# Patient Record
Sex: FEMALE | Race: WHITE | Employment: OTHER | URBAN - METROPOLITAN AREA
[De-identification: names, ages, dates, MRNs, and addresses within clinical notes are randomized per-mention and may not be internally consistent; named-entity substitution may affect disease eponyms.]

---

## 2018-03-01 ENCOUNTER — HOSPITAL ENCOUNTER (OUTPATIENT)
Dept: CARDIAC REHAB | Age: 78
Discharge: HOME | End: 2018-03-01
Attending: INTERNAL MEDICINE | Admitting: INTERNAL MEDICINE

## 2018-03-01 DIAGNOSIS — J44.9 COPD, SEVERE (HCC): ICD-10-CM

## 2018-03-01 PROCEDURE — 94618 PULMONARY STRESS TESTING: CPT | Performed by: INTERNAL MEDICINE

## 2018-03-02 PROBLEM — J44.9 COPD, SEVERE (HCC): Status: ACTIVE | Noted: 2018-03-02

## 2018-03-06 ENCOUNTER — HOSPITAL ENCOUNTER (OUTPATIENT)
Dept: CARDIAC REHAB | Age: 78
Discharge: HOME | End: 2018-03-31
Attending: INTERNAL MEDICINE | Admitting: INTERNAL MEDICINE

## 2018-03-06 RX ORDER — AMLODIPINE BESYLATE 5 MG/1
5 TABLET ORAL DAILY
COMMUNITY

## 2018-03-06 RX ORDER — METOPROLOL SUCCINATE 25 MG/1
25 TABLET, EXTENDED RELEASE ORAL DAILY
COMMUNITY

## 2018-03-06 RX ORDER — PANTOPRAZOLE SODIUM 40 MG/10ML
40 INJECTION, POWDER, LYOPHILIZED, FOR SOLUTION INTRAVENOUS DAILY
Status: ON HOLD | COMMUNITY
End: 2019-12-30

## 2018-03-06 RX ORDER — ATORVASTATIN CALCIUM 40 MG/1
40 TABLET, FILM COATED ORAL DAILY
COMMUNITY

## 2018-03-08 ENCOUNTER — HOSPITAL ENCOUNTER (OUTPATIENT)
Dept: CARDIAC REHAB | Age: 78
Discharge: HOME | End: 2018-03-09

## 2018-03-08 VITALS — WEIGHT: 91.5 LBS

## 2018-03-15 ENCOUNTER — HOSPITAL ENCOUNTER (OUTPATIENT)
Dept: CARDIAC REHAB | Age: 78
Discharge: HOME | End: 2018-03-16

## 2018-03-15 VITALS — WEIGHT: 92.1 LBS

## 2018-03-20 ENCOUNTER — HOSPITAL ENCOUNTER (OUTPATIENT)
Dept: CARDIAC REHAB | Age: 78
Discharge: HOME | End: 2018-03-21

## 2018-03-20 VITALS — WEIGHT: 92.9 LBS

## 2018-03-20 NOTE — FLOWSHEET NOTE
Attended part 1 of nutrition education class. Session time= 12:55-2:30pm. Increased workload on NS. No complaint.

## 2018-03-22 ENCOUNTER — HOSPITAL ENCOUNTER (OUTPATIENT)
Dept: CARDIAC REHAB | Age: 78
Discharge: HOME | End: 2018-03-23

## 2018-03-22 VITALS — WEIGHT: 92.8 LBS

## 2018-03-27 ENCOUNTER — HOSPITAL ENCOUNTER (OUTPATIENT)
Dept: CARDIAC REHAB | Age: 78
Discharge: HOME | End: 2018-03-28

## 2018-03-27 VITALS — WEIGHT: 94 LBS

## 2018-03-29 ENCOUNTER — HOSPITAL ENCOUNTER (OUTPATIENT)
Dept: CARDIAC REHAB | Age: 78
Discharge: HOME | End: 2018-03-30

## 2018-03-29 VITALS — WEIGHT: 93.4 LBS

## 2018-04-01 ENCOUNTER — HOSPITAL ENCOUNTER (OUTPATIENT)
Dept: OTHER | Age: 78
Discharge: HOME | End: 2018-04-30
Attending: INTERNAL MEDICINE | Admitting: INTERNAL MEDICINE

## 2018-04-03 ENCOUNTER — HOSPITAL ENCOUNTER (OUTPATIENT)
Dept: CARDIAC REHAB | Age: 78
Discharge: HOME | End: 2018-04-04

## 2018-04-03 VITALS — WEIGHT: 93.6 LBS

## 2018-04-03 NOTE — PROGRESS NOTES
Post Rehab   6 MWT  ft = % pred   METs  SpO2: %  MPH  HR:  bpm      RPD:  , RPE:                                        GOALS  List at least 2 patient specific goals    [x]Improve activity for tolerance for routine tasks and walking    [x]Learn proper breathing techniques including PLB    [x]Learn proper pacing with activities    []Learn proper use of oxygen, systems and safety    [x]Learn proper exercise guidelines    []Learn proper nutrition for my diagnosis      [x] \"Would like to be able to get out and work in the yard/gardening this summer\"                    Learning Barrier(s)  [] Speech           [] Literacy              [] Hearing          [] Cognitive            [] Vision             [x]  Ready to Learn          Balance Issues  [] Y  [x] N            Orthopedic Issues  []  Y[x]  N        Rate your Physical   Fitness (PF)?    5/10    Handgrip:  Right: 17  Left:18    EDUCATION  [x]  Staff Introduction  [x]  Proper setup/O2 use  [x]  Equipment Babcock'n  [x]  RPD/RPE Explain  [x]  SpO2/HR Explain  [x]  Breathing Retraining  [x]  Explain Intensity  [x] Initial Levels set GOALS          -Activity tolerance is improving.         -Continues to learn        -Continues to learn              :   -Continues to learn                -\"not yet\"                Rate your physical   fitness (PF)?:   6/10        -30 day PF goal:  7/10    EDUCATION  [x] Understands proper set/up O2 use  [x]  Understands SpO2 and RPD? [x] Aerobic progression explained? [x]  Intensity progression explained?           GOALS                                                                                           Rate your physical   fitness (PF)?:   /10        -30 day PF goal:  /10    EDUCATION   []  Home Activity education offered  [] Stretching/ Flexibility education offered  [] Attended Benefits of Exercise Class  []  Attended Resistance Training Class GOALS                                                                                           Rate your physical   fitness (PF)?:   /10    -30 day PF goal:  /10    EDUCATION  [] Attended all individually relevant exercise education sessions? []  Knows current exercise goals and recmndtns?:                                                  Goals Achieved? Rate your physical fitness now?:     /10  Handgrip:  Right:  Left:    Discharge  EDUCATION  []  Attended all individually relevant exercise education sessions?    [] Knows current exercise goals and recmndtns?:                                                                                        PHYSICIAN DIRECTED   EXERCISE RX     RPE : 11 - 14  Titrate O2 to keep SpO2 >89%    Frequency (F): 2-3x/wk in Rehab,         Initial Intensity : 2.5 METs (from 6MWT)   1.5--2 (60-80% of walk speed for TM)    Type (T): Aerobic (TM,NS, SF, AE, AB, RB, EL, Arc), RT 1-3#, 8-16 reps    CAN USE ALL EQUIPMENT EXCEPT       Time (Ti): Aerobic:   15-40 min               Progression: 1-2 min total time for TM, AB, NS, SF or Arm ergometer per session or when a steady state has occurred in RPE if  SpO2 and HR levels are acceptable           PHYSICIAN DIRECTED   EXERCISE RX       Titrate O2 to keep SpO2 >89%    Frequency (F): 2-3x/wk in Rehab, 1-3x/wk home walking       Intensity (I):  Initial + 5-10% increase each week or when a steady state has occurred in RPE if  SpO2 and HR levels are acceptable  Type (T)  Aerobic (TM,NS, SFR,SFS, AE, AB, RB), RT   8-16 reps    CAN USE ALL EQUIPMENT EXCEPT        Time (Ti): Aerobic:   30-40 min        Progression:   1-2 min total time for TM, AB, NS, SF or Arm ergometer per session or when a steady state has occurred in RPE if  SpO2 and HR levels are acceptable        Is pt. progressing in rehab?:    Yes           PHYSICIAN DIRECTED   EXERCISE RX       Titrate O2 week is recommended)            Weight Gain        30 day   Target Goal:    [] increase proteins  [] add nutrition  Supplement  [] 6 small meals      Did pt meet Initial 30 day Target Goal(s)?:     New 30 DAY TARGET GOAL:    [] Decrease saturated fats  [] Decrease gas producing  cruciferous veggies   -  Reasonable, achievable 30 day weight goal:                                  Discussed adding more protein and fiber to diet Did pt meet previous 30 day Target   Goal(s)?:     New 30 DAY TARGET GOAL:    [] Switch to whole grains whenever possible  [] Decrease/ Eliminate carbonated beverages    Reasonable, achievable 30 day weight goal:    Did pt meet previous 30 day Target   Goal(s)?:      New 30 DAY TARGET GOAL:    [] Smaller meals more frequently  [] Decrease portion sizes by 25%      Reasonable, achievable 30 day weight goal:                  Did pt meet previous 30 day Target   Goal(s)?:                         Marixa's INDIVIDUAL TREATMENT PLAN  PSYCHOSOCIAL DOMAIN:    Psychosocial   Initial Assessment  Day 1 Psychosocial   Intervention  Day 2-30 Psychosocial  Re-Assessment  Day 31-60 Psychosocial   Re-Assessment  Day 61-90+ Psychosocial Discharge  Final Day   Psychosocial Screening Tools    (X) PHQ-9 score: 5      (X) SF-36: 84       (X) UCSD SOB score: 47         PHQ-9 Score: If score >or =15, Action:     SF-36 Mental Score:     UCSD Score: Any action on Screening Tools?:                  Psychosocial Screening   Tools    Repeat PHQ-9 Score if mariluz:    Repeat SF-36      Repeat UCSD SOB Score:       Assessment  []  S/S of depression identified?     []  PCP notified of PHQ-9 results      []  On Anti-anxiety / anti-depression meds?: 0           Intervention  If S/S of depression present, action taken:     Is the patient receiving support for the TX program at home?:  []  Y    [x] N- pt lives alone and has limited contacts    Is the patient tolerating the intensity and duration of the TX program?:     [x] outings with daughters and friends but has been unable to keep up due to her increased shortness of breath and fatigue. This has been worse since her hospitalization in December 2017. She admits to occasionally having mild bouts of anxiety and depression, rating both a 3/10, but states she quickly \"gets over it\". She rates her mood 8/10.     30 Day Progress Report 4/3/2018  Attendance: Bryan Salmon has attended 8 exercise sessions and 2 education classes during Pulmonary Rehab. Exercise: She has exercised 20-40 minutes at low-moderate levels. Oxygen:Exercises on room air. Medications: No changes  Nutrition: Wt stable at 93.6lbs. She has attended part 1&2 of general nutrition class. Psychosocial: Reports no changes in her levels of anxiety and depression and her mood remains mostly positive. Leon Jennings RN                 Referring Physician: Dr. Angelica Sotelo                                              Fax #:[phone]    Reviewed and electronically signed by Dr Viky Davila.  Bryson Saunders, Medical Director

## 2018-04-05 ENCOUNTER — HOSPITAL ENCOUNTER (OUTPATIENT)
Dept: CARDIAC REHAB | Age: 78
Discharge: HOME | End: 2018-04-06

## 2018-04-05 VITALS — WEIGHT: 94.5 LBS

## 2018-04-10 ENCOUNTER — HOSPITAL ENCOUNTER (OUTPATIENT)
Dept: CARDIAC REHAB | Age: 78
Discharge: HOME | End: 2018-04-11

## 2018-04-10 VITALS — WEIGHT: 94.1 LBS

## 2018-04-17 ENCOUNTER — HOSPITAL ENCOUNTER (OUTPATIENT)
Dept: CARDIAC REHAB | Age: 78
Discharge: HOME | End: 2018-04-18

## 2018-04-17 VITALS — WEIGHT: 94.1 LBS

## 2018-04-19 ENCOUNTER — HOSPITAL ENCOUNTER (OUTPATIENT)
Dept: CARDIAC REHAB | Age: 78
Discharge: HOME | End: 2018-04-20

## 2018-04-19 VITALS — WEIGHT: 94.4 LBS

## 2018-04-24 ENCOUNTER — HOSPITAL ENCOUNTER (OUTPATIENT)
Dept: CARDIAC REHAB | Age: 78
Discharge: HOME | End: 2018-04-25

## 2018-04-24 VITALS — WEIGHT: 94.1 LBS

## 2018-04-26 ENCOUNTER — HOSPITAL ENCOUNTER (OUTPATIENT)
Dept: CARDIAC REHAB | Age: 78
Discharge: HOME | End: 2018-04-27

## 2018-04-26 VITALS — WEIGHT: 94 LBS

## 2018-05-01 ENCOUNTER — HOSPITAL ENCOUNTER (OUTPATIENT)
Dept: OTHER | Age: 78
Discharge: HOME | End: 2018-05-31
Attending: INTERNAL MEDICINE | Admitting: INTERNAL MEDICINE

## 2018-05-01 ENCOUNTER — HOSPITAL ENCOUNTER (OUTPATIENT)
Dept: CARDIAC REHAB | Age: 78
Discharge: HOME | End: 2018-05-02

## 2018-05-01 VITALS — WEIGHT: 93 LBS

## 2018-05-03 ENCOUNTER — HOSPITAL ENCOUNTER (OUTPATIENT)
Dept: CARDIAC REHAB | Age: 78
Discharge: HOME | End: 2018-05-04

## 2018-05-03 VITALS — WEIGHT: 93.1 LBS

## 2018-05-08 ENCOUNTER — HOSPITAL ENCOUNTER (OUTPATIENT)
Dept: CARDIAC REHAB | Age: 78
Discharge: HOME | End: 2018-05-09

## 2018-05-08 VITALS — WEIGHT: 95 LBS

## 2018-05-10 ENCOUNTER — HOSPITAL ENCOUNTER (OUTPATIENT)
Dept: CARDIAC REHAB | Age: 78
Discharge: HOME | End: 2018-05-11

## 2018-05-10 VITALS — WEIGHT: 94.1 LBS

## 2018-05-15 ENCOUNTER — HOSPITAL ENCOUNTER (OUTPATIENT)
Dept: CARDIAC REHAB | Age: 78
Discharge: HOME | End: 2018-05-16

## 2018-05-15 VITALS — WEIGHT: 94.2 LBS

## 2018-05-22 ENCOUNTER — HOSPITAL ENCOUNTER (OUTPATIENT)
Dept: CARDIAC REHAB | Age: 78
Discharge: HOME | End: 2018-05-23

## 2018-05-22 VITALS — WEIGHT: 94 LBS

## 2018-05-24 ENCOUNTER — HOSPITAL ENCOUNTER (OUTPATIENT)
Dept: CARDIAC REHAB | Age: 78
Discharge: HOME | End: 2018-05-25

## 2018-05-24 VITALS — WEIGHT: 93.6 LBS

## 2018-05-29 ENCOUNTER — HOSPITAL ENCOUNTER (OUTPATIENT)
Dept: CARDIAC REHAB | Age: 78
Discharge: HOME | End: 2018-05-30

## 2018-05-29 VITALS — WEIGHT: 93.3 LBS

## 2018-05-29 NOTE — PROGRESS NOTES
Attended education class on preventing and recognizing infection and when to call the doctor. Also explained bronchial hygiene techniques.  Session time= 12:50-2:25pm

## 2018-05-31 ENCOUNTER — HOSPITAL ENCOUNTER (OUTPATIENT)
Dept: CARDIAC REHAB | Age: 78
Discharge: HOME | End: 2018-06-30

## 2018-05-31 VITALS — WEIGHT: 93.4 LBS

## 2018-06-01 ENCOUNTER — HOSPITAL ENCOUNTER (OUTPATIENT)
Dept: OTHER | Age: 78
Discharge: HOME | End: 2018-06-01
Attending: INTERNAL MEDICINE | Admitting: INTERNAL MEDICINE

## 2018-06-05 ENCOUNTER — HOSPITAL ENCOUNTER (OUTPATIENT)
Dept: CARDIAC REHAB | Age: 78
Discharge: HOME | End: 2018-06-06

## 2018-06-05 VITALS — WEIGHT: 93 LBS

## 2018-06-07 ENCOUNTER — HOSPITAL ENCOUNTER (OUTPATIENT)
Dept: CARDIAC REHAB | Age: 78
Discharge: HOME | End: 2018-06-08

## 2018-06-07 VITALS — WEIGHT: 93 LBS

## 2018-06-07 PROCEDURE — 94618 PULMONARY STRESS TESTING: CPT | Performed by: INTERNAL MEDICINE

## 2018-06-07 NOTE — PROCEDURES
[facility] Cardiopulmonary Rehabilitation   Six Minute Walk Test    Melanie PALACIOSB: 1940  Account Number: [de-identified]  AGE: 78 y.o.     OP test []   Pulmonary Rehab PRE []  POST   [x]    Diagnosis:  Severe COPD    Referring Physician: Dr. Deisi Carpio    Gender []  male [x] female AGE:77 Race:    Height:4 ft 10 in 147 cm    Weight:93 lbs  42 kg  Blood Pressure 126/70    Medications affecting heart rate:  None      Supplemental O2 during the test []  no [x] yes 2 lpm     [x] continuous []  intermittent    Device : [x] NC []  HFNC    []  oximizer  [] mask      Laps completed: 8.5 (1 lap = 100 ft)        Baseline (resting) Walking End of Test (recovery)      Heart Rate 67   93  62    BP  126/70   112/60  130/72    Dyspnea 0   3  0 (Kamala scale)    Fatigue 0   2  0 (Kamala scale)    SpO2  100%   96%  100%    Stopped or paused before 6 mins? [x]  no [] yes How long? Symptoms at end of exercise:[] angina  [] dizziness  []  hip, leg, calf, back pain       [x]  no complaints []  other    Number of laps 8 (x 30.48 meters) 244 meters + final partial lap: 15 meters    Total distance walked in 6 mins: 259 meters    Predicted distance: 447.5 meters  Percent predicted:58%              [] normal       [x] reduced     Pre Rehab Walk: Walked 305 meters; Lowest SaO2= 80% RA (92% on 2lpm); %predicted =68% (reduced); Dyspnea=9; Fatigue=5  Post Rehab Walk: Walked 259 meters; Lowest SaO2= 96% 2lpm; % predicted= 58% (reduced); Dyspnea=3; Fatigue=2       Summary: This is a post-rehab test, performed on 2 liters of oxygen. The patient ambulated 259 meters which is abnormal- 58-% predicted. Her  heart rate response was normal, the blood pressure response was blunted and decrease with exertion, oxygen saturations were normal.   The degree of symptoms based on the Kamala Dyspnea/Fatigue scale were increased with testing. Oxygen level remained at 96% on supplemental oxygen.   When compared to the pre-rehab walk test, she ambulated 47 meters less, which is a significant decline in distance.             Leeanna Castro, DO  Pulmonary Rehab Director

## 2018-06-07 NOTE — PROGRESS NOTES
Preparing for completion of the program.  6 min walk completed. Final paperwork given to the patient to complete and return. Pt states she will continue exercise at home. Maintenance program was reviewed with patient.

## 2018-06-12 ENCOUNTER — HOSPITAL ENCOUNTER (OUTPATIENT)
Dept: CARDIAC REHAB | Age: 78
Discharge: HOME | End: 2018-06-13

## 2018-06-14 ENCOUNTER — HOSPITAL ENCOUNTER (OUTPATIENT)
Dept: CARDIAC REHAB | Age: 78
Discharge: HOME | End: 2018-06-15

## 2018-06-14 VITALS — WEIGHT: 92 LBS

## 2018-07-01 ENCOUNTER — HOSPITAL ENCOUNTER (OUTPATIENT)
Dept: OTHER | Age: 78
Discharge: HOME | End: 2018-07-31
Attending: INTERNAL MEDICINE | Admitting: INTERNAL MEDICINE

## 2018-07-03 NOTE — PROGRESS NOTES
RPD: 9, RPE: 5                                           Post Rehab   6 MWT  ft = 58% pred  2.2 METs  SpO2:96 %  1.6 MPH  HR: 93 bpm      RPD:  3, RPE:                               0         GOALS  List at least 2 patient specific goals    [x]Improve activity for tolerance for routine tasks and walking    [x]Learn proper breathing techniques including PLB    [x]Learn proper pacing with activities    []Learn proper use of oxygen, systems and safety    [x]Learn proper exercise guidelines    []Learn proper nutrition for my diagnosis      [x] \"Would like to be able to get out and work in the yard/gardening this summer\"                    Learning Barrier(s)  [] Speech           [] Literacy              [] Hearing          [] Cognitive            [] Vision             [x]  Ready to Learn          Balance Issues  [] Y  [x] N            Orthopedic Issues  []  Y[x]  N        Rate your Physical   Fitness (PF)?    5/10    Handgrip:  Right: 17  Left:18    EDUCATION  [x]  Staff Introduction  [x]  Proper setup/O2 use  [x]  Equipment Emigrant'n  [x]  RPD/RPE Explain  [x]  SpO2/HR Explain  [x]  Breathing Retraining  [x]  Explain Intensity  [x] Initial Levels set GOALS          -Activity tolerance is improving.         -Continues to learn        -Continues to learn              :   -Continues to learn                -\"not yet\"                Rate your physical   fitness (PF)?:   6/10        -30 day PF goal:  7/10    EDUCATION  [x] Understands proper set/up O2 use  [x]  Understands SpO2 and RPD? [x] Aerobic progression explained? [x]  Intensity progression explained?           GOALS          - Yes- \"a little\"            - Yes          - Yes                  - Yes                  - \"not yet\"                                                                                         Rate your physical   fitness (PF)?:   6/10    \"Can climb stairs without sob now\"      -30 day PF goal:  7/10    EDUCATION   []  Home Activity education per session or when a steady state has occurred in RPE if  SpO2 and HR levels are acceptable        Is pt. progressing in rehab?:    Yes           PHYSICIAN DIRECTED   EXERCISE RX       Titrate O2 to keep SpO2 >89%    Frequency (F): 2-3x/wk in Rehab, 1-3x/wk home walking       Intensity (I):  Initial + 5-10% increase each week when a steady state has occurred in RPE if  SpO2 and HR levels are acceptable  Type (T)  Aerobic (TM,NS, SFR,SFS, AE, AB, RB), RT   8-16 reps    CAN USE ALL EQUIPMENT EXCEPT        Time (Ti): Aerobic:   30-50 min     Progression:   1-2 min total time for TM, AB, NS, SF or Arm ergometer per session or when a steady state has occurred in RPE if  SpO2 and HR levels are acceptable              Is pt. progressing in rehab?:  Yes               PHYSICIAN DIRECTED   EXERCISE RX       Titrate O2 to keep SpO2 >89%    Frequency (F): 2-3x/wk in Rehab, 1-3x/wk home walking       Intensity (I):  Initial + 5-10% increase each week when a steady state has occurred in RPE if  SpO2 and HR levels are acceptable  Type (T):   Aerobic (TM,NS, SFR,SFS, AE, AB, RB), RT   8-16 reps    CAN USE ALL EQUIPMENT EXCEPT          Time (Ti): Aerobic:   30-58 min         Progression:   1-2 min total time for TM, AB, NS, SF or Arm ergometer per session or when a steady state has occurred in RPE if  SpO2 and HR levels are acceptable            Is pt. progressing in rehab?:               Final Intensity (I): See below and final 6MWT above            ACHIEVED TOTAL AEROBIC EXERCISE TIME:  min         FINAL LEVELS:  [x] TM: 150min  [x] Nustep: level 7 min:20  [x] Arm ergometer:10 ndiaye min  [] Scifit: level min  [] HW :  # x  reps  [] Dy:    X   reps  [] Cybex RT:    # x   Reps  [] Eliptical:level  X  Min  [] Arc: level   X    mins  [] RB: 2 Level  X 10  mins  [] AB: level   X     Min              Did pt. progress in rehab?:Yes     30 DAY TARGET GOAL  [x] Start slowly but progress each week  [x] Increase duration on the Management     Intervention/  Education                Marixa's INDIVIDUAL TREATMENT PLAN  OXYGEN AND MEDICATIONS    OXYGEN  MEDICATIONS   Initial Assessment  Day 1 OXYGEN  MEDICATIONS  Intervention  Day 2-30 OXYGEN  MEDICATION  Re-Assessment  Day 31-60 OXYGEN  MEDICATION  ReAssessment Day 61-90+ OXYGEN  MEDICATION  Discharge  Final Day                    Medications  [x]  Uses as prescribed  []  Non- compliant with prescribed meds    Respiratory Medications    [x]  Proper use   and technique of MDI, DPI and/or nebs  []  Incorrect use and technique of MDI, DPI and /or nebs    Hypoxemia  Problem:    [x]  No problem  [] Noncompliant with O2 use  []  Oxygen in [place] only  []  No home O2  []  No portable O2  []  Needs O2 prescription and O2 qualifying data sent for setup   ()Poor knowledge of O2 use/safety    Current Oxygen Prescription:    l/min rest  2 l/min exercise   titration   plan/weaning plan:  CPAP/BIPAP:    Goals:     []  Manage Hypoxemia  []  receive adequate portable system  [x]  Compliant with use as prescribed  []  Learn O2 safety guidelines   Medications  [x]  Uses as prescribed  []  Non- compliant with prescribed meds    Respiratory Medications    [x] Proper use and technique of MDI, DPI and/or nebs  []  Incorrect use and technique of MDI, DPI and /or nebs    Hypoxemia  Problem:      Current Oxygen Prescription:    l/min rest   l/min exercise   titration plan/weaning plan:    Goals:   []  Manage Hypoxemia  []  receive adequate portable system  [x]  Compliant with use as prescribed  [x]  Learn O2 safety guidelines           Medications  [x]  Uses as prescribed  [] Non- compliant with prescribed meds    Respiratory Medications    []  Proper use and technique of MDI, DPI and/or nebs  [] Incorrect use and technique of MDI, DPI and /or nebs    Hypoxemia  Problem:      Current Oxygen Prescription:    l/min rest   l/min exercise   titration plan/weaning plan:  2lpm at HS  Goals:   []  Manage Hypoxemia  []  receive GOALS        Did pt meet previous 30 day Target Goal(s)?:      RE- ASSESSMENT GOAL    [x] Decrease/  Maintain anxiety and depression level  [x] Increase ability to complete ADL  -          (0 being 'None', 10 being 'Very'),  -How would you rate your Anxiety Level: 1      -How would you rate your level of depression: 0    -How would you rate your mood:  8/10                              ADL'S- 8 GOALS        Did pt meet previous 30 day Target Goal(s)?:      RE- ASSESSMENT GOAL    [x] Decrease/  Maintain anxiety and depression level  [] Increase ability to complete ADL  -          (0 being 'None', 10 being 'Very'),  -How would you rate your Anxiety Level:      -How would you rate your level of depression:    -How would you rate your mood:     Discharge            Did pts SF-36 Mental Score increase?:  unchanged        [x] Pt is aware of the s/s of depression    [] Received Psychosocial Education    Any follow up with physicians  necessary?:      [] Y    [x] N               Marixa's INDIVIDUAL TREATMENT PLAN  EDUCATION DOMAIN:    EDUCATION   Initial Assessment  Day 1 EDUCATION   Intervention  Day 2-30 EDUCATION   Re-Assessment  Day 31-60 EDUCATION   ReAssessment Day 61-90+ EDUCATION Discharge  Final Day                      Education  [x] Equipment/Staff Orientation  [x] Breathing Retraining  [x] Importance of Clean Hands    Chronic Cough?:   [] Y   [x]  N  Spacer?:   [x]   Y   []  N    Sleep Apnea?:  [] Y    [x] N     [x] Education Book given       Education  Individual instruction  [x] PLB  [x] RPD/RPE   [] O2 use  []  review PFT  [x] Inhalers   [x]Home Activity/Warm up/ stretches  Attend Classes:  [x] Nutrition  [] Panic control, relaxation, managing depression  [x] A&P of the Respiratory System   [] Environ. Issues+ Travel   [] Bronchial Hygiene / Preventing Infection  [] Resistance Training  []  Benefits of Exercise  [] Energy Cons        Education  Individual instruction  [] PLB  [] RPD/RPE   [] O2 use  [] review PFT  [] Inhalers   [] Home Activity/Warm up/ stretches  Attend Classes:  [] Nutrition  [x]  Panic conntrol, relaxation, managing depression  []  A&P of the Respiratory System   [x] Environ. Issues+ Travel   [] Bronchial Hygiene / Preventing Infection  [x]  Resistance Training  [] Benefits of Exercise  [x] Energy Cons    Education  Individual instruction  [] PLB  [] RPD/RPE   []  O2 use  []  review PFT  [] Inhalers   [] Home Activity/Warm up/ stretches  Attend Classes:  [] Nutrition  []  Panic conntrol, relaxation, managing depression  [] A&P of the Respiratory System   [] Environ. Issues+ Travel   [x] Bronchial Hygiene / Preventing Infection  []  Resistance Training  [] Benefits of Exercise  [] Energy Cons   Education  [x]  Addressed pt questions on Education Topics                                                         Physician Interaction / Response:  (If none, continue on present care plan)     Physician Interaction / Response:   (If none, continue on present care plan)   Physician Interaction / Response:   (If none, continue on present care plan)   Physician Interaction / Response:   (If none, continue on present care plan)   Physician Interaction / Response:       Discharge the patient. Rehab Potential:  []  Good [] Fair [] Poor    Summary  Siri Avina is a pleasant 78 yr old female with a history of Severe COPD and HTN who was referred to Pulmonary Rehab here at [facility] by her Pulmonologist for shortness of breath with exertion. She had a recent hospitalization in December 2017 for pneumonia and feels that she is not yet back to her baseline. She was discharged home on Oxygen 2 lpm at that time and has remained on it continuously since then. She will be attending exercise session in [place] twice a week. Faisal Sarkar will attend 28 Sessions of [place] Phase 2. Exercise: Faisal Sarkar will participate in 15-45 min of aerobic exercise.  Time and intensity to be determined based on patient's social with staff and other rehab participants. She continues to rate her anxiety and depression a 0/10 and her mood unchanged at 8/10. Other: At this time, Alban Burris is unsure of her exercise plans despite our encouragement to do so. Mahamed Vee RN                Referring Physician: Dr. Ilda Sanford                                              Fax #:[phone]    Reviewed and electronically signed by Dr Claude Grimaldo.  Alexia Weston, Medical Director

## 2019-07-04 ENCOUNTER — HOSPITAL ENCOUNTER (EMERGENCY)
Age: 79
Discharge: HOME | End: 2019-07-04
Attending: EMERGENCY MEDICINE
Payer: MEDICARE

## 2019-07-04 ENCOUNTER — APPOINTMENT (OUTPATIENT)
Dept: CT IMAGING | Age: 79
End: 2019-07-04
Payer: MEDICARE

## 2019-07-04 ENCOUNTER — APPOINTMENT (OUTPATIENT)
Dept: GENERAL RADIOLOGY | Age: 79
End: 2019-07-04
Payer: MEDICARE

## 2019-07-04 VITALS
HEART RATE: 71 BPM | TEMPERATURE: 97.3 F | OXYGEN SATURATION: 100 % | SYSTOLIC BLOOD PRESSURE: 120 MMHG | RESPIRATION RATE: 20 BRPM | DIASTOLIC BLOOD PRESSURE: 65 MMHG

## 2019-07-04 DIAGNOSIS — J44.9 CHRONIC OBSTRUCTIVE PULMONARY DISEASE, UNSPECIFIED COPD TYPE (HCC): ICD-10-CM

## 2019-07-04 DIAGNOSIS — R07.89 CHEST WALL PAIN: Primary | ICD-10-CM

## 2019-07-04 LAB
A/G RATIO: 1.4 (ref 1.1–2.2)
ALBUMIN SERPL-MCNC: 4.4 G/DL (ref 3.4–5)
ALP BLD-CCNC: 84 U/L (ref 40–129)
ALT SERPL-CCNC: 10 U/L (ref 10–40)
ANION GAP SERPL CALCULATED.3IONS-SCNC: 13 MMOL/L (ref 3–16)
AST SERPL-CCNC: 17 U/L (ref 15–37)
BASE EXCESS VENOUS: -1.1 MMOL/L
BASOPHILS ABSOLUTE: 0 K/UL (ref 0–0.2)
BASOPHILS RELATIVE PERCENT: 0.4 %
BILIRUB SERPL-MCNC: 0.6 MG/DL (ref 0–1)
BUN BLDV-MCNC: 20 MG/DL (ref 7–20)
CALCIUM SERPL-MCNC: 9.8 MG/DL (ref 8.3–10.6)
CARBOXYHEMOGLOBIN: 3.3 %
CHLORIDE BLD-SCNC: 101 MMOL/L (ref 99–110)
CO2: 24 MMOL/L (ref 21–32)
CREAT SERPL-MCNC: 1.2 MG/DL (ref 0.6–1.2)
EOSINOPHILS ABSOLUTE: 0.1 K/UL (ref 0–0.6)
EOSINOPHILS RELATIVE PERCENT: 1 %
GFR AFRICAN AMERICAN: 53
GFR NON-AFRICAN AMERICAN: 44
GLOBULIN: 3.2 G/DL
GLUCOSE BLD-MCNC: 136 MG/DL (ref 70–99)
HCO3 VENOUS: 25 MMOL/L (ref 23–29)
HCT VFR BLD CALC: 35.8 % (ref 36–48)
HEMOGLOBIN: 11.9 G/DL (ref 12–16)
LACTIC ACID: 1.1 MMOL/L (ref 0.4–2)
LYMPHOCYTES ABSOLUTE: 1.6 K/UL (ref 1–5.1)
LYMPHOCYTES RELATIVE PERCENT: 16.7 %
MCH RBC QN AUTO: 33.7 PG (ref 26–34)
MCHC RBC AUTO-ENTMCNC: 33.4 G/DL (ref 31–36)
MCV RBC AUTO: 101 FL (ref 80–100)
METHEMOGLOBIN VENOUS: 0.6 %
MONOCYTES ABSOLUTE: 0.8 K/UL (ref 0–1.3)
MONOCYTES RELATIVE PERCENT: 8.4 %
NEUTROPHILS ABSOLUTE: 7 K/UL (ref 1.7–7.7)
NEUTROPHILS RELATIVE PERCENT: 73.5 %
O2 CONTENT, VEN: 13 ML/DL
O2 SAT, VEN: 80 %
O2 THERAPY: ABNORMAL
PCO2, VEN: 49.5 MMHG (ref 40–50)
PDW BLD-RTO: 12.9 % (ref 12.4–15.4)
PH VENOUS: 7.32 (ref 7.35–7.45)
PLATELET # BLD: 260 K/UL (ref 135–450)
PMV BLD AUTO: 8.1 FL (ref 5–10.5)
PO2, VEN: 45 MMHG
POTASSIUM REFLEX MAGNESIUM: 4.3 MMOL/L (ref 3.5–5.1)
PRO-BNP: 625 PG/ML (ref 0–449)
RBC # BLD: 3.54 M/UL (ref 4–5.2)
SODIUM BLD-SCNC: 138 MMOL/L (ref 136–145)
TCO2 CALC VENOUS: 26 MMOL/L
TOTAL PROTEIN: 7.6 G/DL (ref 6.4–8.2)
TROPONIN: <0.01 NG/ML
TROPONIN: <0.01 NG/ML
WBC # BLD: 9.5 K/UL (ref 4–11)

## 2019-07-04 PROCEDURE — 93005 ELECTROCARDIOGRAM TRACING: CPT | Performed by: NURSE PRACTITIONER

## 2019-07-04 PROCEDURE — 71260 CT THORAX DX C+: CPT

## 2019-07-04 PROCEDURE — 71046 X-RAY EXAM CHEST 2 VIEWS: CPT

## 2019-07-04 PROCEDURE — 99284 EMERGENCY DEPT VISIT MOD MDM: CPT

## 2019-07-04 PROCEDURE — 82803 BLOOD GASES ANY COMBINATION: CPT

## 2019-07-04 PROCEDURE — 83880 ASSAY OF NATRIURETIC PEPTIDE: CPT

## 2019-07-04 PROCEDURE — 85025 COMPLETE CBC W/AUTO DIFF WBC: CPT

## 2019-07-04 PROCEDURE — 80053 COMPREHEN METABOLIC PANEL: CPT

## 2019-07-04 PROCEDURE — 6360000004 HC RX CONTRAST MEDICATION: Performed by: NURSE PRACTITIONER

## 2019-07-04 PROCEDURE — 87040 BLOOD CULTURE FOR BACTERIA: CPT

## 2019-07-04 PROCEDURE — 83605 ASSAY OF LACTIC ACID: CPT

## 2019-07-04 PROCEDURE — 84484 ASSAY OF TROPONIN QUANT: CPT

## 2019-07-04 RX ADMIN — IOPAMIDOL 75 ML: 755 INJECTION, SOLUTION INTRAVENOUS at 17:11

## 2019-07-04 ASSESSMENT — PAIN DESCRIPTION - LOCATION: LOCATION: RIB CAGE

## 2019-07-04 ASSESSMENT — ENCOUNTER SYMPTOMS
CONSTIPATION: 0
BACK PAIN: 0
NAUSEA: 0
SHORTNESS OF BREATH: 1
VOMITING: 0
DIARRHEA: 0
COUGH: 0
ABDOMINAL DISTENTION: 0
COLOR CHANGE: 0
ABDOMINAL PAIN: 0

## 2019-07-04 ASSESSMENT — PAIN DESCRIPTION - PROGRESSION: CLINICAL_PROGRESSION: GRADUALLY WORSENING

## 2019-07-04 ASSESSMENT — PAIN DESCRIPTION - DESCRIPTORS: DESCRIPTORS: SHARP

## 2019-07-04 ASSESSMENT — PAIN DESCRIPTION - FREQUENCY: FREQUENCY: CONTINUOUS

## 2019-07-04 ASSESSMENT — PAIN SCALES - GENERAL: PAINLEVEL_OUTOF10: 7

## 2019-07-04 ASSESSMENT — PAIN DESCRIPTION - ORIENTATION: ORIENTATION: LEFT

## 2019-07-04 ASSESSMENT — PAIN DESCRIPTION - ONSET: ONSET: GRADUAL

## 2019-07-04 ASSESSMENT — PAIN DESCRIPTION - PAIN TYPE: TYPE: ACUTE PAIN

## 2019-07-04 NOTE — ED NOTES
Bed: [facility]  Expected date:   Expected time:   Means of arrival:   Comments:  [address] [place] [address]  Noemi Leal RN  07/04/19 [id number]

## 2019-07-04 NOTE — ED PROVIDER NOTES
Inability: Not on file    Transportation needs:     Medical: Not on file     Non-medical: Not on file   Tobacco Use    Smoking status: Not on file   Substance and Sexual Activity    Alcohol use: Not on file    Drug use: Not on file    Sexual activity: Not on file   Lifestyle    Physical activity:     Days per week: Not on file     Minutes per session: Not on file    Stress: Not on file   Relationships    Social connections:     Talks on phone: Not on file     Gets together: Not on file     Attends [employer] service: Not on file     Active member of club or organization: Not on file     Attends meetings of clubs or organizations: Not on file     Relationship status: Not on file    Intimate partner violence:     Fear of current or ex partner: Not on file     Emotionally abused: Not on file     Physically abused: Not on file     Forced sexual activity: Not on file   Other Topics Concern    Not on file   Social History Narrative    Not on file       SCREENINGS             PHYSICAL EXAM    (up to 7 for level 4, 8 or more for level 5)     ED Triage Vitals   BP Temp Temp Source Pulse Resp SpO2 Height Weight   07/04/19 1456 07/04/19 1456 07/04/19 1456 07/04/19 1456 07/04/19 1456 07/04/19 1455 -- --   (!) 112/59 97.3 °F (36.3 °C) Oral 81 22 (!) 83 %         Physical Exam   Constitutional: She is oriented to person, place, and time. Vital signs are normal. She appears well-developed and well-nourished. Non-toxic appearance. No distress. HENT:   Head: Normocephalic and atraumatic. Eyes: Conjunctivae are normal. No scleral icterus. Neck: Full passive range of motion without pain. Neck supple. No JVD present. No neck rigidity. Cardiovascular: Normal rate and regular rhythm. Exam reveals no gallop and no friction rub. No murmur heard. Pulmonary/Chest: Effort normal and breath sounds normal. No respiratory distress. She exhibits tenderness.  She exhibits no mass, no laceration, no crepitus, no edema, no [address] [place] [address] [facility]  [address] [id number]  [phone]  Go to   As needed      DISCHARGE MEDICATIONS:  Discharge Medication List as of 7/4/2019  7:41 PM          DISCONTINUED MEDICATIONS:  Discharge Medication List as of 7/4/2019  7:41 PM                 (Please note that portions ofthis note were completed with a voice recognition program.  Efforts were made to edit the dictations but occasionally words are mis-transcribed.)    ANGELICA Barone CNP (electronically signed)           ANGELICA Barone CNP  07/04/19 0909

## 2019-07-05 LAB
EKG ATRIAL RATE: 78 BPM
EKG DIAGNOSIS: NORMAL
EKG P AXIS: 81 DEGREES
EKG P-R INTERVAL: 130 MS
EKG Q-T INTERVAL: 386 MS
EKG QRS DURATION: 64 MS
EKG QTC CALCULATION (BAZETT): 440 MS
EKG R AXIS: 89 DEGREES
EKG T AXIS: 66 DEGREES
EKG VENTRICULAR RATE: 78 BPM

## 2019-07-05 PROCEDURE — 93010 ELECTROCARDIOGRAM REPORT: CPT | Performed by: INTERNAL MEDICINE

## 2019-07-09 LAB — BLOOD CULTURE, ROUTINE: NORMAL

## 2019-07-19 NOTE — ED PROVIDER NOTES
[facility]      Pt Name: Melvin Ayon  MRN: [id number]  [place] 1940  Date of evaluation: 7/4/2019  Provider: Tamiko Johnson, [address] [place] [address]  Chief Complaint   Patient presents with    Shortness of Breath     with ambulation 83% on RA     Rib Pain     left side        I have fully participated in the care of Melvin Ayon and have had a face-to-face evaluation. I have reviewed and agree with all pertinent clinical information, and midlevel provider's history, and physical exam. I have also reviewed the labs, EKG, and imaging studies and treatment plan. I have also reviewed and agree with the medications, allergies and past medical history section for this Melvin Ayon. I agree with the diagnosis, and I concur. Past Medical History:   Diagnosis Date    COPD (chronic obstructive pulmonary disease) (HCC)     GERD (gastroesophageal reflux disease)     Hyperlipidemia     Hypertension        MDM:  Melvin Ayon is a 70 y.o. female who presents with Left-sided chest pain shortness of breath is been present for a few days. She denies any falls. She states the pain started spontaneously. Physical exam reveals mild end expiratory wheezes. She has tenderness along the left-sided chest.  Abdomen is nontender. Laboratory work revealed a pH of 7.32. Her BNP was 725. White count was normal.  Patient stated she did not want to be admitted to the hospital.  Therefore, she was discharged to follow-up with her doctor in 3 to 5 days and return if any problems. .    Vitals:    07/04/19 1939   BP:    Pulse: 71   Resp: 20   Temp:    SpO2: 100%       Labs Reviewed   CBC WITH AUTO DIFFERENTIAL - Abnormal; Notable for the following components:       Result Value    RBC 3.54 (*)     Hemoglobin 11.9 (*)     Hematocrit 35.8 (*)     .0 (*)     All other components within normal limits    Narrative:     Performed at:  [facility] Phone [phone]       See discharge instructions for specific medications, discharge information, and treatments. They were verbally instructed to return to emergency if any problems. Medications   iopamidol (ISOVUE-370) 76 % injection 75 mL (75 mLs Intravenous Given 7/4/19 0576)       Discharge Medication List as of 7/4/2019  7:41 PM          The patient's blood pressure was not found to be elevated according to CMS/Medicare and the Affordable Care Act/ObamaCare criteria. IMPRESSIONS:  1. Chest wall pain    2.  Chronic obstructive pulmonary disease, unspecified COPD type [facility])               Calista Milian DO  07/19/19 9062

## 2019-12-29 ENCOUNTER — HOSPITAL ENCOUNTER (INPATIENT)
Age: 79
LOS: 3 days | Discharge: HOME | DRG: 190 | End: 2020-01-01
Attending: EMERGENCY MEDICINE | Admitting: INTERNAL MEDICINE
Payer: MEDICARE

## 2019-12-29 ENCOUNTER — APPOINTMENT (OUTPATIENT)
Dept: GENERAL RADIOLOGY | Age: 79
DRG: 190 | End: 2019-12-29
Payer: MEDICARE

## 2019-12-29 PROBLEM — J44.9 COPD (CHRONIC OBSTRUCTIVE PULMONARY DISEASE) (HCC): Status: ACTIVE | Noted: 2019-12-29

## 2019-12-29 LAB
ANION GAP SERPL CALCULATED.3IONS-SCNC: 16 MMOL/L (ref 3–16)
BASE EXCESS VENOUS: -3.1 MMOL/L
BASOPHILS ABSOLUTE: 0 K/UL (ref 0–0.2)
BASOPHILS RELATIVE PERCENT: 0.5 %
BUN BLDV-MCNC: 12 MG/DL (ref 7–20)
CALCIUM SERPL-MCNC: 9.5 MG/DL (ref 8.3–10.6)
CARBOXYHEMOGLOBIN: 3 %
CHLORIDE BLD-SCNC: 100 MMOL/L (ref 99–110)
CO2: 20 MMOL/L (ref 21–32)
CREAT SERPL-MCNC: 1 MG/DL (ref 0.6–1.2)
EOSINOPHILS ABSOLUTE: 0 K/UL (ref 0–0.6)
EOSINOPHILS RELATIVE PERCENT: 0.6 %
GFR AFRICAN AMERICAN: >60
GFR NON-AFRICAN AMERICAN: 54
GLUCOSE BLD-MCNC: 197 MG/DL (ref 70–99)
HCO3 VENOUS: 24 MMOL/L (ref 23–29)
HCT VFR BLD CALC: 35 % (ref 36–48)
HEMOGLOBIN: 12.2 G/DL (ref 12–16)
LYMPHOCYTES ABSOLUTE: 0.7 K/UL (ref 1–5.1)
LYMPHOCYTES RELATIVE PERCENT: 11.1 %
MCH RBC QN AUTO: 37.4 PG (ref 26–34)
MCHC RBC AUTO-ENTMCNC: 35 G/DL (ref 31–36)
MCV RBC AUTO: 106.9 FL (ref 80–100)
METHEMOGLOBIN VENOUS: 0.9 %
MONOCYTES ABSOLUTE: 0.6 K/UL (ref 0–1.3)
MONOCYTES RELATIVE PERCENT: 10.9 %
NEUTROPHILS ABSOLUTE: 4.6 K/UL (ref 1.7–7.7)
NEUTROPHILS RELATIVE PERCENT: 76.9 %
O2 CONTENT, VEN: 13 ML/DL
O2 SAT, VEN: 81 %
O2 THERAPY: ABNORMAL
PCO2, VEN: 51.7 MMHG (ref 40–50)
PDW BLD-RTO: 12.5 % (ref 12.4–15.4)
PH VENOUS: 7.28 (ref 7.35–7.45)
PLATELET # BLD: 194 K/UL (ref 135–450)
PMV BLD AUTO: 8.6 FL (ref 5–10.5)
PO2, VEN: 46 MMHG
POTASSIUM REFLEX MAGNESIUM: 4.1 MMOL/L (ref 3.5–5.1)
PRO-BNP: 390 PG/ML (ref 0–449)
RAPID INFLUENZA  B AGN: NEGATIVE
RAPID INFLUENZA A AGN: NEGATIVE
RBC # BLD: 3.27 M/UL (ref 4–5.2)
SODIUM BLD-SCNC: 136 MMOL/L (ref 136–145)
TCO2 CALC VENOUS: 25 MMOL/L
TROPONIN: <0.01 NG/ML
WBC # BLD: 6 K/UL (ref 4–11)

## 2019-12-29 PROCEDURE — 2580000003 HC RX 258: Performed by: INTERNAL MEDICINE

## 2019-12-29 PROCEDURE — 82803 BLOOD GASES ANY COMBINATION: CPT

## 2019-12-29 PROCEDURE — 85025 COMPLETE CBC W/AUTO DIFF WBC: CPT

## 2019-12-29 PROCEDURE — 36415 COLL VENOUS BLD VENIPUNCTURE: CPT

## 2019-12-29 PROCEDURE — 6370000000 HC RX 637 (ALT 250 FOR IP): Performed by: NURSE PRACTITIONER

## 2019-12-29 PROCEDURE — 2700000000 HC OXYGEN THERAPY PER DAY

## 2019-12-29 PROCEDURE — 87804 INFLUENZA ASSAY W/OPTIC: CPT

## 2019-12-29 PROCEDURE — 2500000003 HC RX 250 WO HCPCS: Performed by: INTERNAL MEDICINE

## 2019-12-29 PROCEDURE — 6360000002 HC RX W HCPCS: Performed by: INTERNAL MEDICINE

## 2019-12-29 PROCEDURE — 83880 ASSAY OF NATRIURETIC PEPTIDE: CPT

## 2019-12-29 PROCEDURE — 94640 AIRWAY INHALATION TREATMENT: CPT

## 2019-12-29 PROCEDURE — 84484 ASSAY OF TROPONIN QUANT: CPT

## 2019-12-29 PROCEDURE — 6370000000 HC RX 637 (ALT 250 FOR IP): Performed by: INTERNAL MEDICINE

## 2019-12-29 PROCEDURE — 2060000000 HC ICU INTERMEDIATE R&B

## 2019-12-29 PROCEDURE — 71046 X-RAY EXAM CHEST 2 VIEWS: CPT

## 2019-12-29 PROCEDURE — 99285 EMERGENCY DEPT VISIT HI MDM: CPT

## 2019-12-29 PROCEDURE — 94761 N-INVAS EAR/PLS OXIMETRY MLT: CPT

## 2019-12-29 PROCEDURE — 93005 ELECTROCARDIOGRAM TRACING: CPT | Performed by: EMERGENCY MEDICINE

## 2019-12-29 PROCEDURE — 6370000000 HC RX 637 (ALT 250 FOR IP): Performed by: PHYSICIAN ASSISTANT

## 2019-12-29 PROCEDURE — 80048 BASIC METABOLIC PNL TOTAL CA: CPT

## 2019-12-29 PROCEDURE — 96374 THER/PROPH/DIAG INJ IV PUSH: CPT

## 2019-12-29 PROCEDURE — 6360000002 HC RX W HCPCS: Performed by: PHYSICIAN ASSISTANT

## 2019-12-29 RX ORDER — IPRATROPIUM BROMIDE AND ALBUTEROL SULFATE 2.5; .5 MG/3ML; MG/3ML
1 SOLUTION RESPIRATORY (INHALATION)
Status: DISCONTINUED | OUTPATIENT
Start: 2019-12-29 | End: 2020-01-01 | Stop reason: HOSPADM

## 2019-12-29 RX ORDER — SODIUM CHLORIDE 0.9 % (FLUSH) 0.9 %
10 SYRINGE (ML) INJECTION EVERY 12 HOURS SCHEDULED
Status: DISCONTINUED | OUTPATIENT
Start: 2019-12-29 | End: 2020-01-01 | Stop reason: HOSPADM

## 2019-12-29 RX ORDER — FORMOTEROL FUMARATE 20 UG/2ML
20 SOLUTION RESPIRATORY (INHALATION) 2 TIMES DAILY
Status: DISCONTINUED | OUTPATIENT
Start: 2019-12-30 | End: 2019-12-30

## 2019-12-29 RX ORDER — ACETAMINOPHEN 325 MG/1
650 TABLET ORAL EVERY 4 HOURS PRN
Status: DISCONTINUED | OUTPATIENT
Start: 2019-12-29 | End: 2020-01-01 | Stop reason: HOSPADM

## 2019-12-29 RX ORDER — IPRATROPIUM BROMIDE AND ALBUTEROL SULFATE 2.5; .5 MG/3ML; MG/3ML
1 SOLUTION RESPIRATORY (INHALATION) ONCE
Status: COMPLETED | OUTPATIENT
Start: 2019-12-29 | End: 2019-12-29

## 2019-12-29 RX ORDER — ASPIRIN 81 MG/1
81 TABLET, CHEWABLE ORAL DAILY
Status: DISCONTINUED | OUTPATIENT
Start: 2019-12-30 | End: 2020-01-01 | Stop reason: HOSPADM

## 2019-12-29 RX ORDER — METHYLPREDNISOLONE SODIUM SUCCINATE 40 MG/ML
40 INJECTION, POWDER, LYOPHILIZED, FOR SOLUTION INTRAMUSCULAR; INTRAVENOUS EVERY 12 HOURS
Status: DISCONTINUED | OUTPATIENT
Start: 2019-12-30 | End: 2019-12-31

## 2019-12-29 RX ORDER — ATORVASTATIN CALCIUM 40 MG/1
40 TABLET, FILM COATED ORAL NIGHTLY
Status: DISCONTINUED | OUTPATIENT
Start: 2019-12-29 | End: 2020-01-01 | Stop reason: HOSPADM

## 2019-12-29 RX ORDER — LANOLIN ALCOHOL/MO/W.PET/CERES
3 CREAM (GRAM) TOPICAL NIGHTLY PRN
Status: DISCONTINUED | OUTPATIENT
Start: 2019-12-29 | End: 2020-01-01 | Stop reason: HOSPADM

## 2019-12-29 RX ORDER — SODIUM CHLORIDE 0.9 % (FLUSH) 0.9 %
10 SYRINGE (ML) INJECTION PRN
Status: DISCONTINUED | OUTPATIENT
Start: 2019-12-29 | End: 2020-01-01 | Stop reason: HOSPADM

## 2019-12-29 RX ORDER — ONDANSETRON 2 MG/ML
4 INJECTION INTRAMUSCULAR; INTRAVENOUS EVERY 6 HOURS PRN
Status: DISCONTINUED | OUTPATIENT
Start: 2019-12-29 | End: 2020-01-01 | Stop reason: HOSPADM

## 2019-12-29 RX ORDER — METHYLPREDNISOLONE SODIUM SUCCINATE 125 MG/2ML
125 INJECTION, POWDER, LYOPHILIZED, FOR SOLUTION INTRAMUSCULAR; INTRAVENOUS ONCE
Status: COMPLETED | OUTPATIENT
Start: 2019-12-29 | End: 2019-12-29

## 2019-12-29 RX ORDER — PANTOPRAZOLE SODIUM 40 MG/10ML
40 INJECTION, POWDER, LYOPHILIZED, FOR SOLUTION INTRAVENOUS
Status: DISCONTINUED | OUTPATIENT
Start: 2019-12-30 | End: 2020-01-01 | Stop reason: HOSPADM

## 2019-12-29 RX ORDER — METOPROLOL SUCCINATE 25 MG/1
25 TABLET, EXTENDED RELEASE ORAL DAILY
Status: DISCONTINUED | OUTPATIENT
Start: 2019-12-30 | End: 2020-01-01 | Stop reason: HOSPADM

## 2019-12-29 RX ADMIN — MELATONIN 3 MG: at 22:17

## 2019-12-29 RX ADMIN — IPRATROPIUM BROMIDE AND ALBUTEROL SULFATE 1 AMPULE: .5; 3 SOLUTION RESPIRATORY (INHALATION) at 14:57

## 2019-12-29 RX ADMIN — IPRATROPIUM BROMIDE AND ALBUTEROL SULFATE 1 AMPULE: .5; 3 SOLUTION RESPIRATORY (INHALATION) at 19:41

## 2019-12-29 RX ADMIN — DOXYCYCLINE 100 MG: 100 INJECTION, POWDER, LYOPHILIZED, FOR SOLUTION INTRAVENOUS at 20:23

## 2019-12-29 RX ADMIN — METHYLPREDNISOLONE SODIUM SUCCINATE 125 MG: 125 INJECTION, POWDER, FOR SOLUTION INTRAMUSCULAR; INTRAVENOUS at 13:26

## 2019-12-29 RX ADMIN — IPRATROPIUM BROMIDE AND ALBUTEROL SULFATE 1 AMPULE: .5; 3 SOLUTION RESPIRATORY (INHALATION) at 13:00

## 2019-12-29 RX ADMIN — ENOXAPARIN SODIUM 30 MG: 30 INJECTION SUBCUTANEOUS at 21:46

## 2019-12-29 RX ADMIN — FORMOTEROL FUMARATE DIHYDRATE 20 MCG: 20 SOLUTION RESPIRATORY (INHALATION) at 19:40

## 2019-12-29 RX ADMIN — SODIUM CHLORIDE, PRESERVATIVE FREE 10 ML: 5 INJECTION INTRAVENOUS at 20:22

## 2019-12-29 ASSESSMENT — PAIN SCALES - GENERAL
PAINLEVEL_OUTOF10: 0
PAINLEVEL_OUTOF10: 0

## 2019-12-29 ASSESSMENT — ENCOUNTER SYMPTOMS
SHORTNESS OF BREATH: 1
NAUSEA: 0
TROUBLE SWALLOWING: 0
VOMITING: 0
ABDOMINAL PAIN: 0
COUGH: 1

## 2019-12-29 NOTE — H&P
the lungs daily    Historical Provider, MD   ALBUTEROL SULFATE HFA IN Inhale 2 puffs into the lungs    Historical Provider, MD       Allergies:  Sulfamethoxazole-trimethoprim    Social History:  The patient currently lives at home. TOBACCO:   has no history on file for tobacco.  ETOH:   has no history on file for alcohol. Family History:  Reviewed in detail and negative for DM, Early CAD, Cancer, CVA. Positive as follows:    No family history on file. REVIEW OF SYSTEMS:   As noted in the HPI. All other systems reviewed and negative. PHYSICAL EXAM:    BP (!) 140/99   Pulse 91   Temp 97 °F (36.1 °C) (Oral)   Resp 18   Ht 4' 10\" (1.473 m)   Wt 82 lb (37.2 kg)   SpO2 96%   BMI 17.14 kg/m²     General appearance: No apparent distress appears stated age and cooperative. HEENT Normal cephalic, atraumatic without obvious deformity. Pupils equal, round, and reactive to light. Extra ocular muscles intact. Conjunctivae/corneas clear. Neck: Supple, No jugular venous distention/bruits. Trachea midline without thyromegaly or adenopathy with full range of motion. Lungs: greatly diminished lung sounds with wheezing and rhonchi throughout. Heart: Regular rate and rhythm with Normal S1/S2 without murmurs, rubs or gallops, point of maximum impulse non-displaced  Abdomen: Soft, non-tender or non-distended without rigidity or guarding and positive bowel sounds all four quadrants. Extremities: No clubbing, cyanosis, or edema bilaterally. Full range of motion without deformity and normal gait intact. Skin: Skin color, texture, turgor normal.  No rashes or lesions. Neurologic: Alert and oriented X 3, neurovascularly intact with sensory/motor intact upper extremities/lower extremities, bilaterally. Cranial nerves: II-XII intact, grossly non-focal.  Mental status: Alert, oriented, thought content appropriate.   Capillary Refill: Acceptable  < 3 seconds  Peripheral Pulses: +3 Easily felt, not easily

## 2019-12-29 NOTE — ED PROVIDER NOTES
[facility]        Pt Name: Tiff Duran  MRN: [id number]  [place] 1940  Date of evaluation: 12/29/2019  Provider: LUIS ANTONIO Haynes  PCP: Matteo Moore    This patient was seen and evaluated by the attending physician Colonel Mcburney, MD.      [facility]       Chief Complaint   Patient presents with    Shortness of Breath       HISTORY OF PRESENT ILLNESS   (Location/Symptom, Timing/Onset, Context/Setting, Quality, Duration, Modifying Factors, Severity)  Note limiting factors. Tiff Duran is a 70 y.o. female presents the emergency department today with complaints of shortness of breath. It is present for the last few days. She does have a history of COPD. She does not smoke. She does not have any chest pain associated with it. She has not had any swelling in her extremities. She denies any nausea, vomiting, abdominal pain. She has tried her home nebulizer treatments with no significant improvement to her symptoms. She has no further complaints at this time    Nursing Notes were all reviewed and agreed with or any disagreements were addressed in the HPI. REVIEW OF SYSTEMS    (2-9 systems for level 4, 10 or more for level 5)     Review of Systems   Constitutional: Negative for chills and fever. HENT: Positive for congestion. Negative for trouble swallowing. Respiratory: Positive for cough and shortness of breath. Cardiovascular: Negative for chest pain, palpitations and leg swelling. Gastrointestinal: Negative for abdominal pain, nausea and vomiting. Neurological: Negative for facial asymmetry, weakness, light-headedness and numbness. Positives and Pertinent negatives as per HPI. Except as noted above in the ROS, all other systems were reviewed and negative.        PAST MEDICAL HISTORY     Past Medical History:   Diagnosis Date    COPD (chronic obstructive pulmonary disease) (HCC)     GERD (gastroesophageal reflux disease)     Hyperlipidemia     Hypertension          SURGICAL HISTORY   No past surgical history on file. CURRENTMEDICATIONS       Previous Medications    ALBUTEROL SULFATE HFA IN    Inhale 2 puffs into the lungs    AMLODIPINE (NORVASC) 5 MG TABLET    Take 5 mg by mouth daily    ASPIRIN 81 MG TABLET    Take 81 mg by mouth daily    ATORVASTATIN (LIPITOR) 40 MG TABLET    Take 40 mg by mouth daily    METOPROLOL SUCCINATE (TOPROL XL) 25 MG EXTENDED RELEASE TABLET    Take 25 mg by mouth daily    PANTOPRAZOLE (PROTONIX) 40 MG INJECTION    Infuse 40 mg intravenously daily    UMECLIDINIUM-VILANTEROL (ANORO ELLIPTA) 62.5-25 MCG/INH AEPB INHALER    Inhale 1 puff into the lungs daily         ALLERGIES     Sulfamethoxazole-trimethoprim    FAMILYHISTORY     No family history on file. SOCIAL HISTORY       Social History     Tobacco Use    Smoking status: Not on file   Substance Use Topics    Alcohol use: Not on file    Drug use: Not on file       SCREENINGS             PHYSICAL EXAM    (up to 7 for level 4, 8 or more for level 5)     ED Triage Vitals   BP Temp Temp Source Pulse Resp SpO2 Height Weight   12/29/19 1254 12/29/19 1200 12/29/19 1200 12/29/19 1200 12/29/19 1200 12/29/19 1200 12/29/19 1254 12/29/19 1254   (!) 140/99 97 °F (36.1 °C) Oral 91 22 92 % 4' 10\" (1.473 m) 82 lb (37.2 kg)       Physical Exam  Vitals signs and nursing note reviewed. Constitutional:       General: She is not in acute distress. Appearance: She is well-developed. She is not ill-appearing, toxic-appearing or diaphoretic. Interventions: Nasal cannula in place. Comments: Wears 2L O2 at baseline   HENT:      Head: Normocephalic and atraumatic. Eyes:      Conjunctiva/sclera: Conjunctivae normal.      Pupils: Pupils are equal, round, and reactive to light. Cardiovascular:      Pulses:           Dorsalis pedis pulses are 2+ on the right side and 2+ on the left side.    Pulmonary:      Effort: Prolonged expiration present. No respiratory distress. Breath sounds: Decreased air movement present. Decreased breath sounds, wheezing and rhonchi present. Musculoskeletal:      Right lower leg: No edema. Left lower leg: No edema. Skin:     General: Skin is warm and dry. Neurological:      Mental Status: She is alert and oriented to person, place, and time. GCS: GCS eye subscore is 4. GCS verbal subscore is 5. GCS motor subscore is 6. Cranial Nerves: Cranial nerves are intact. Psychiatric:         Behavior: Behavior normal. Behavior is cooperative. Thought Content:  Thought content normal.         DIAGNOSTIC RESULTS   LABS:    Labs Reviewed   CBC WITH AUTO DIFFERENTIAL - Abnormal; Notable for the following components:       Result Value    RBC 3.27 (*)     Hematocrit 35.0 (*)     .9 (*)     MCH 37.4 (*)     Lymphocytes Absolute 0.7 (*)     All other components within normal limits    Narrative:     Performed at:  [address] [facility]   Phone [phone]   BASIC METABOLIC PANEL W/ REFLEX TO MG FOR LOW K - Abnormal; Notable for the following components:    CO2 20 (*)     Glucose 197 (*)     GFR Non- 54 (*)     All other components within normal limits    Narrative:     Performed at:  [facility]  [address] [facility] [employer] [address]   Phone [phone]   BLOOD GAS, VENOUS - Abnormal; Notable for the following components:    pH, Eleazar 7.279 (*)     pCO2, Eleazar 51.7 (*)     All other components within normal limits    Narrative:     Performed at:  [facility]  [address] [place] [place] [facility] [employer] [address]   Phone [phone]   RAPID INFLUENZA A/B ANTIGENS    Narrative:     Performed at:  [place] [address] [facility] [employer] [address]   Phone [phone]   [address]

## 2019-12-29 NOTE — ED NOTES
[facility]  Expected date:   Expected time:   Means of arrival:   Comments:  triage     Randal Moore RN  12/29/19 8203

## 2019-12-30 PROBLEM — J44.1 COPD EXACERBATION (HCC): Status: ACTIVE | Noted: 2019-12-29

## 2019-12-30 LAB
ALBUMIN SERPL-MCNC: 4.3 G/DL (ref 3.4–5)
ANION GAP SERPL CALCULATED.3IONS-SCNC: 15 MMOL/L (ref 3–16)
BASOPHILS ABSOLUTE: 0 K/UL (ref 0–0.2)
BASOPHILS RELATIVE PERCENT: 0.1 %
BUN BLDV-MCNC: 11 MG/DL (ref 7–20)
CALCIUM SERPL-MCNC: 9.2 MG/DL (ref 8.3–10.6)
CHLORIDE BLD-SCNC: 96 MMOL/L (ref 99–110)
CO2: 22 MMOL/L (ref 21–32)
CREAT SERPL-MCNC: 1 MG/DL (ref 0.6–1.2)
EKG ATRIAL RATE: 83 BPM
EKG DIAGNOSIS: NORMAL
EKG P AXIS: 87 DEGREES
EKG P-R INTERVAL: 128 MS
EKG Q-T INTERVAL: 388 MS
EKG QRS DURATION: 72 MS
EKG QTC CALCULATION (BAZETT): 455 MS
EKG R AXIS: 84 DEGREES
EKG T AXIS: 20 DEGREES
EKG VENTRICULAR RATE: 83 BPM
EOSINOPHILS ABSOLUTE: 0 K/UL (ref 0–0.6)
EOSINOPHILS RELATIVE PERCENT: 0 %
GFR AFRICAN AMERICAN: >60
GFR NON-AFRICAN AMERICAN: 54
GLUCOSE BLD-MCNC: 172 MG/DL (ref 70–99)
HCT VFR BLD CALC: 32.7 % (ref 36–48)
HEMOGLOBIN: 11.2 G/DL (ref 12–16)
LYMPHOCYTES ABSOLUTE: 0.3 K/UL (ref 1–5.1)
LYMPHOCYTES RELATIVE PERCENT: 9.9 %
MCH RBC QN AUTO: 34.6 PG (ref 26–34)
MCHC RBC AUTO-ENTMCNC: 34.3 G/DL (ref 31–36)
MCV RBC AUTO: 100.7 FL (ref 80–100)
MONOCYTES ABSOLUTE: 0.1 K/UL (ref 0–1.3)
MONOCYTES RELATIVE PERCENT: 2.8 %
NEUTROPHILS ABSOLUTE: 3.1 K/UL (ref 1.7–7.7)
NEUTROPHILS RELATIVE PERCENT: 87.2 %
PDW BLD-RTO: 12.2 % (ref 12.4–15.4)
PHOSPHORUS: 3.5 MG/DL (ref 2.5–4.9)
PLATELET # BLD: 179 K/UL (ref 135–450)
PMV BLD AUTO: 7.7 FL (ref 5–10.5)
POTASSIUM SERPL-SCNC: 3.8 MMOL/L (ref 3.5–5.1)
RBC # BLD: 3.25 M/UL (ref 4–5.2)
SODIUM BLD-SCNC: 133 MMOL/L (ref 136–145)
WBC # BLD: 3.5 K/UL (ref 4–11)

## 2019-12-30 PROCEDURE — 36415 COLL VENOUS BLD VENIPUNCTURE: CPT

## 2019-12-30 PROCEDURE — 85025 COMPLETE CBC W/AUTO DIFF WBC: CPT

## 2019-12-30 PROCEDURE — 6360000002 HC RX W HCPCS: Performed by: INTERNAL MEDICINE

## 2019-12-30 PROCEDURE — C9113 INJ PANTOPRAZOLE SODIUM, VIA: HCPCS | Performed by: INTERNAL MEDICINE

## 2019-12-30 PROCEDURE — 2500000003 HC RX 250 WO HCPCS: Performed by: INTERNAL MEDICINE

## 2019-12-30 PROCEDURE — 6370000000 HC RX 637 (ALT 250 FOR IP): Performed by: INTERNAL MEDICINE

## 2019-12-30 PROCEDURE — 93010 ELECTROCARDIOGRAM REPORT: CPT | Performed by: INTERNAL MEDICINE

## 2019-12-30 PROCEDURE — 94640 AIRWAY INHALATION TREATMENT: CPT

## 2019-12-30 PROCEDURE — 2580000003 HC RX 258: Performed by: INTERNAL MEDICINE

## 2019-12-30 PROCEDURE — 80069 RENAL FUNCTION PANEL: CPT

## 2019-12-30 PROCEDURE — 2700000000 HC OXYGEN THERAPY PER DAY

## 2019-12-30 PROCEDURE — 99223 1ST HOSP IP/OBS HIGH 75: CPT | Performed by: INTERNAL MEDICINE

## 2019-12-30 PROCEDURE — 94760 N-INVAS EAR/PLS OXIMETRY 1: CPT

## 2019-12-30 PROCEDURE — 2060000000 HC ICU INTERMEDIATE R&B

## 2019-12-30 RX ORDER — PANTOPRAZOLE SODIUM 40 MG/1
40 TABLET, DELAYED RELEASE ORAL DAILY
COMMUNITY
Start: 2019-10-08 | End: 2021-12-16

## 2019-12-30 RX ADMIN — IPRATROPIUM BROMIDE AND ALBUTEROL SULFATE 1 AMPULE: .5; 3 SOLUTION RESPIRATORY (INHALATION) at 08:40

## 2019-12-30 RX ADMIN — MOMETASONE FUROATE AND FORMOTEROL FUMARATE DIHYDRATE 2 PUFF: 200; 5 AEROSOL RESPIRATORY (INHALATION) at 21:41

## 2019-12-30 RX ADMIN — DOXYCYCLINE 100 MG: 100 INJECTION, POWDER, LYOPHILIZED, FOR SOLUTION INTRAVENOUS at 19:05

## 2019-12-30 RX ADMIN — DOXYCYCLINE 100 MG: 100 INJECTION, POWDER, LYOPHILIZED, FOR SOLUTION INTRAVENOUS at 06:27

## 2019-12-30 RX ADMIN — MOMETASONE FUROATE AND FORMOTEROL FUMARATE DIHYDRATE 2 PUFF: 200; 5 AEROSOL RESPIRATORY (INHALATION) at 10:03

## 2019-12-30 RX ADMIN — ACETAMINOPHEN 650 MG: 325 TABLET ORAL at 09:48

## 2019-12-30 RX ADMIN — FORMOTEROL FUMARATE DIHYDRATE 20 MCG: 20 SOLUTION RESPIRATORY (INHALATION) at 08:38

## 2019-12-30 RX ADMIN — ENOXAPARIN SODIUM 30 MG: 30 INJECTION SUBCUTANEOUS at 21:04

## 2019-12-30 RX ADMIN — PANTOPRAZOLE SODIUM 40 MG: 40 INJECTION, POWDER, FOR SOLUTION INTRAVENOUS at 06:27

## 2019-12-30 RX ADMIN — IPRATROPIUM BROMIDE AND ALBUTEROL SULFATE 1 AMPULE: .5; 3 SOLUTION RESPIRATORY (INHALATION) at 21:40

## 2019-12-30 RX ADMIN — METHYLPREDNISOLONE SODIUM SUCCINATE 40 MG: 40 INJECTION, POWDER, FOR SOLUTION INTRAMUSCULAR; INTRAVENOUS at 13:16

## 2019-12-30 RX ADMIN — TIOTROPIUM BROMIDE 18 MCG: 18 CAPSULE ORAL; RESPIRATORY (INHALATION) at 08:38

## 2019-12-30 RX ADMIN — METOPROLOL SUCCINATE 25 MG: 25 TABLET, EXTENDED RELEASE ORAL at 09:45

## 2019-12-30 RX ADMIN — ATORVASTATIN CALCIUM 40 MG: 40 TABLET, FILM COATED ORAL at 21:03

## 2019-12-30 RX ADMIN — IPRATROPIUM BROMIDE AND ALBUTEROL SULFATE 1 AMPULE: .5; 3 SOLUTION RESPIRATORY (INHALATION) at 03:54

## 2019-12-30 RX ADMIN — IPRATROPIUM BROMIDE AND ALBUTEROL SULFATE 1 AMPULE: .5; 3 SOLUTION RESPIRATORY (INHALATION) at 13:41

## 2019-12-30 RX ADMIN — METHYLPREDNISOLONE SODIUM SUCCINATE 40 MG: 40 INJECTION, POWDER, FOR SOLUTION INTRAMUSCULAR; INTRAVENOUS at 01:35

## 2019-12-30 RX ADMIN — SODIUM CHLORIDE, PRESERVATIVE FREE 10 ML: 5 INJECTION INTRAVENOUS at 21:04

## 2019-12-30 RX ADMIN — SODIUM CHLORIDE, PRESERVATIVE FREE 10 ML: 5 INJECTION INTRAVENOUS at 10:46

## 2019-12-30 RX ADMIN — IPRATROPIUM BROMIDE AND ALBUTEROL SULFATE 1 AMPULE: .5; 3 SOLUTION RESPIRATORY (INHALATION) at 17:13

## 2019-12-30 RX ADMIN — ASPIRIN 81 MG 81 MG: 81 TABLET ORAL at 09:45

## 2019-12-30 ASSESSMENT — PAIN DESCRIPTION - FREQUENCY: FREQUENCY: CONTINUOUS

## 2019-12-30 ASSESSMENT — PAIN DESCRIPTION - DESCRIPTORS: DESCRIPTORS: HEADACHE

## 2019-12-30 ASSESSMENT — PAIN SCALES - GENERAL
PAINLEVEL_OUTOF10: 0
PAINLEVEL_OUTOF10: 4

## 2019-12-30 ASSESSMENT — PAIN DESCRIPTION - ONSET: ONSET: GRADUAL

## 2019-12-30 ASSESSMENT — PAIN DESCRIPTION - LOCATION: LOCATION: HEAD

## 2019-12-30 ASSESSMENT — PAIN DESCRIPTION - PAIN TYPE: TYPE: ACUTE PAIN

## 2019-12-30 ASSESSMENT — PAIN - FUNCTIONAL ASSESSMENT: PAIN_FUNCTIONAL_ASSESSMENT: ACTIVITIES ARE NOT PREVENTED

## 2019-12-30 ASSESSMENT — PAIN DESCRIPTION - ORIENTATION: ORIENTATION: INNER

## 2019-12-30 ASSESSMENT — PAIN DESCRIPTION - PROGRESSION: CLINICAL_PROGRESSION: GRADUALLY WORSENING

## 2019-12-30 NOTE — PROGRESS NOTES
Hospitalist Progress Note      PCP: Jorgito Weber    Date of Admission: 12/29/2019        Subjective: feels better, but still with SOB, cough, no chest pain or fever at this time        Medications:  Reviewed    Infusion Medications   Scheduled Medications    aspirin  81 mg Oral Daily    atorvastatin  40 mg Oral Nightly    metoprolol succinate  25 mg Oral Daily    pantoprazole  40 mg Intravenous QAM AC    ipratropium-albuterol  1 ampule Inhalation Q4H WA    methylPREDNISolone  40 mg Intravenous Q12H    doxycycline (VIBRAMYCIN) IV  100 mg Intravenous Q12H    sodium chloride flush  10 mL Intravenous 2 times per day    enoxaparin  30 mg Subcutaneous Nightly    tiotropium  18 mcg Inhalation Daily    formoterol  20 mcg Nebulization BID     PRN Meds: sodium chloride flush, ondansetron, acetaminophen, melatonin      Intake/Output Summary (Last 24 hours) at 12/30/2019 0821  Last data filed at 12/30/2019 8108  Gross per 24 hour   Intake 240 ml   Output 400 ml   Net -160 ml       Physical Exam Performed:    BP (!) 164/76   Pulse 84   Temp 97.4 °F (36.3 °C) (Oral)   Resp 20   Ht 4' 10\" (1.473 m)   Wt 78 lb 11.2 oz (35.7 kg)   SpO2 96%   BMI 16.45 kg/m²     General appearance: No apparent distress  Neck: Supple  Respiratory:  Expiratory wheezes   Cardiovascular: Regular rate and rhythm with normal S1/S2 without murmurs, rubs or gallops. Abdomen: Soft, non-tender. Musculoskeletal: No clubbing  Skin: Skin color, texture, turgor normal.  No rashes or lesions.   Neurologic:  No focal weakness   Psychiatric: Alert and oriented  Capillary Refill: Brisk,< 3 seconds   Peripheral Pulses: +2 palpable, equal bilaterally       Labs:   Recent Labs     12/29/19  1319 12/30/19  0532   WBC 6.0 3.5*   HGB 12.2 11.2*   HCT 35.0* 32.7*    179     Recent Labs     12/29/19  1319 12/30/19  0532    133*   K 4.1 3.8    96*   CO2 20* 22   BUN 12 11   CREATININE 1.0 1.0   CALCIUM 9.5 9.2   PHOS  --  3.5 No results for input(s): AST, ALT, BILIDIR, BILITOT, ALKPHOS in the last 72 hours. No results for input(s): INR in the last 72 hours. Recent Labs     12/29/19  1319   TROPONINI <0.01       Urinalysis:      Lab Results   Component Value Date    NITRU NEGATIVE 08/14/2011    WBCUA 0-3 08/14/2011    BACTERIA 2+ 08/14/2011    RBCUA 0-2 08/14/2011    BLOODU MODERATE 08/14/2011    SPECGRAV 1.025 08/14/2011    GLUCOSEU NEGATIVE 08/14/2011       Radiology:  XR CHEST STANDARD (2 VW)   Final Result   No evidence of acute cardiopulmonary disease. Unchanged hyperinflation suggesting COPD. Assessment/Plan:    Active Hospital Problems    Diagnosis    [facility] [J44.9]     1. COPD with acute exacerbation, iv steroids, duonebs, likely triggered by URI, pulmonary consulted. 2. Chronic Hypoxic respiratory failure, due to above, oxygen, duonebs, monitor clinically, at risk for complications. Repiratory panel pending. 3. HTN currently controlled. 4. 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 5. GERD, Pantoprazole.      DVT Prophylaxis: lovenox  Diet: DIET GENERAL;  Code Status: Full Code        Dispo - ongoing management     Brianda Garcia MD

## 2019-12-30 NOTE — PROGRESS NOTES
4 Eyes Admission Assessment     I agree as the admission nurse that 2 RN's have performed a thorough Head to Toe Skin Assessment on the patient. ALL assessment sites listed below have been assessed on admission. Areas assessed by both nurses:  [x]   Head, Face, and Ears   [x]   Shoulders, Back, and Chest  [x]   Arms, Elbows, and Hands   [x]   Coccyx, Sacrum, and Ischum  [x]   Legs, Feet, and Heels        Does the Patient have Skin Breakdown?   No         Liam Prevention initiated:  No   Wound Care Orders initiated:  No      [facility] nurse consulted for Pressure Injury (Stage 3,4, Unstageable, DTI, NWPT, and Complex wounds):  No      Nurse 1 eSignature: Electronically signed by Annemarie Collins RN on 12/30/19 at 2:07 AM    **SHARE this note so that the co-signing nurse is able to place an eSignature**    Nurse 2 eSignature: Electronically signed by Korin Frey RN on 12/30/19 at 2:28 AM

## 2019-12-30 NOTE — PLAN OF CARE
Problem: SAFETY  Goal: Free from accidental physical injury  12/30/2019 1112 by Carlos Christensen RN  Outcome: Ongoing     Problem: SAFETY  Goal: Free from intentional harm  12/30/2019 1112 by Carlos Christensen RN  Outcome: Ongoing     Problem: DAILY CARE  Goal: Daily care needs are met  12/30/2019 1112 by Carlos Christensen RN  Outcome: Ongoing     Problem: PAIN  Goal: Patient's pain/discomfort is manageable  12/30/2019 1112 by Carlos Christensen RN  Outcome: Ongoing     Problem: SKIN INTEGRITY  Goal: Skin integrity is maintained or improved  12/30/2019 1112 by Carlos Christensen RN  Outcome: Ongoing     Problem: KNOWLEDGE DEFICIT  Goal: Patient/S.O. demonstrates understanding of disease process, treatment plan, medications, and discharge instructions.   12/30/2019 1112 by Carlos Christensen RN  Outcome: Ongoing     Problem: DISCHARGE BARRIERS  Goal: Patient's continuum of care needs are met  12/30/2019 1112 by Carlos Christensen RN  Outcome: Ongoing

## 2019-12-30 NOTE — PROGRESS NOTES
[address] [place] [address]      NAME: Janee Severin  MEDICAL RECORD NUMBER:  [id number]  AGE: 70 y.o. GENDER: female  : 1940  TODAY'S DATE:  2019      I met with Janee Severin today to discuss the [address] [place] [address]. A pamphlet was also provided that includes a description of our services as well as our contact information. Any questions from the patient were answered. Action plan for COPD exacerbation reviewed with patient today. Pertinent wellness education will be reviewed with this patient at their visit including but not limited to: Adherence to medication therapy and appropriate follow up. [] Janee Severin is interested in attending our [facility], a referral will be obtained and an appointment will be scheduled. [x] Patient declines [facility] follow up at this time. Reports that she has a working nebulizer and all of her meds at home    [facility] [place]  Phone: [phone]  Fax [phone]    We currently have outpatient services in COPD, Heart Failure, Diabetes, Anticoagulation and Infusion.

## 2019-12-31 PROBLEM — E43 SEVERE MALNUTRITION (HCC): Status: ACTIVE | Noted: 2019-12-31

## 2019-12-31 LAB
ANION GAP SERPL CALCULATED.3IONS-SCNC: 17 MMOL/L (ref 3–16)
BASOPHILS ABSOLUTE: 0 K/UL (ref 0–0.2)
BASOPHILS RELATIVE PERCENT: 0.1 %
BUN BLDV-MCNC: 20 MG/DL (ref 7–20)
CALCIUM SERPL-MCNC: 9.3 MG/DL (ref 8.3–10.6)
CHLORIDE BLD-SCNC: 99 MMOL/L (ref 99–110)
CO2: 21 MMOL/L (ref 21–32)
CREAT SERPL-MCNC: 1.1 MG/DL (ref 0.6–1.2)
EOSINOPHILS ABSOLUTE: 0 K/UL (ref 0–0.6)
EOSINOPHILS RELATIVE PERCENT: 0 %
GFR AFRICAN AMERICAN: 58
GFR NON-AFRICAN AMERICAN: 48
GLUCOSE BLD-MCNC: 121 MG/DL (ref 70–99)
HCT VFR BLD CALC: 32.1 % (ref 36–48)
HEMOGLOBIN: 11 G/DL (ref 12–16)
LYMPHOCYTES ABSOLUTE: 1 K/UL (ref 1–5.1)
LYMPHOCYTES RELATIVE PERCENT: 8.5 %
MCH RBC QN AUTO: 34.3 PG (ref 26–34)
MCHC RBC AUTO-ENTMCNC: 34.2 G/DL (ref 31–36)
MCV RBC AUTO: 100.3 FL (ref 80–100)
MONOCYTES ABSOLUTE: 1.3 K/UL (ref 0–1.3)
MONOCYTES RELATIVE PERCENT: 10.9 %
NEUTROPHILS ABSOLUTE: 9.3 K/UL (ref 1.7–7.7)
NEUTROPHILS RELATIVE PERCENT: 80.5 %
PDW BLD-RTO: 12.4 % (ref 12.4–15.4)
PLATELET # BLD: 194 K/UL (ref 135–450)
PMV BLD AUTO: 7.7 FL (ref 5–10.5)
POTASSIUM SERPL-SCNC: 3.7 MMOL/L (ref 3.5–5.1)
RBC # BLD: 3.21 M/UL (ref 4–5.2)
SODIUM BLD-SCNC: 137 MMOL/L (ref 136–145)
WBC # BLD: 11.6 K/UL (ref 4–11)

## 2019-12-31 PROCEDURE — 6370000000 HC RX 637 (ALT 250 FOR IP): Performed by: INTERNAL MEDICINE

## 2019-12-31 PROCEDURE — 36415 COLL VENOUS BLD VENIPUNCTURE: CPT

## 2019-12-31 PROCEDURE — 2500000003 HC RX 250 WO HCPCS: Performed by: INTERNAL MEDICINE

## 2019-12-31 PROCEDURE — 2060000000 HC ICU INTERMEDIATE R&B

## 2019-12-31 PROCEDURE — 2700000000 HC OXYGEN THERAPY PER DAY

## 2019-12-31 PROCEDURE — 97535 SELF CARE MNGMENT TRAINING: CPT

## 2019-12-31 PROCEDURE — C9113 INJ PANTOPRAZOLE SODIUM, VIA: HCPCS | Performed by: INTERNAL MEDICINE

## 2019-12-31 PROCEDURE — 85025 COMPLETE CBC W/AUTO DIFF WBC: CPT

## 2019-12-31 PROCEDURE — 6370000000 HC RX 637 (ALT 250 FOR IP): Performed by: NURSE PRACTITIONER

## 2019-12-31 PROCEDURE — 80048 BASIC METABOLIC PNL TOTAL CA: CPT

## 2019-12-31 PROCEDURE — 94761 N-INVAS EAR/PLS OXIMETRY MLT: CPT

## 2019-12-31 PROCEDURE — 97165 OT EVAL LOW COMPLEX 30 MIN: CPT

## 2019-12-31 PROCEDURE — 99232 SBSQ HOSP IP/OBS MODERATE 35: CPT | Performed by: INTERNAL MEDICINE

## 2019-12-31 PROCEDURE — 97161 PT EVAL LOW COMPLEX 20 MIN: CPT

## 2019-12-31 PROCEDURE — 6360000002 HC RX W HCPCS: Performed by: INTERNAL MEDICINE

## 2019-12-31 PROCEDURE — 2580000003 HC RX 258: Performed by: INTERNAL MEDICINE

## 2019-12-31 PROCEDURE — 94640 AIRWAY INHALATION TREATMENT: CPT

## 2019-12-31 RX ORDER — PREDNISONE 20 MG/1
20 TABLET ORAL DAILY
Status: DISCONTINUED | OUTPATIENT
Start: 2019-12-31 | End: 2020-01-01 | Stop reason: HOSPADM

## 2019-12-31 RX ORDER — PREDNISONE 10 MG/1
10 TABLET ORAL DAILY
Status: DISCONTINUED | OUTPATIENT
Start: 2020-01-06 | End: 2020-01-01 | Stop reason: HOSPADM

## 2019-12-31 RX ORDER — PREDNISONE 1 MG/1
5 TABLET ORAL DAILY
Status: DISCONTINUED | OUTPATIENT
Start: 2020-01-09 | End: 2020-01-01 | Stop reason: HOSPADM

## 2019-12-31 RX ADMIN — MOMETASONE FUROATE AND FORMOTEROL FUMARATE DIHYDRATE 2 PUFF: 200; 5 AEROSOL RESPIRATORY (INHALATION) at 08:48

## 2019-12-31 RX ADMIN — METHYLPREDNISOLONE SODIUM SUCCINATE 40 MG: 40 INJECTION, POWDER, FOR SOLUTION INTRAMUSCULAR; INTRAVENOUS at 00:05

## 2019-12-31 RX ADMIN — DOXYCYCLINE 100 MG: 100 INJECTION, POWDER, LYOPHILIZED, FOR SOLUTION INTRAVENOUS at 18:17

## 2019-12-31 RX ADMIN — IPRATROPIUM BROMIDE AND ALBUTEROL SULFATE 1 AMPULE: .5; 3 SOLUTION RESPIRATORY (INHALATION) at 17:33

## 2019-12-31 RX ADMIN — PREDNISONE 20 MG: 20 TABLET ORAL at 11:31

## 2019-12-31 RX ADMIN — ATORVASTATIN CALCIUM 40 MG: 40 TABLET, FILM COATED ORAL at 19:56

## 2019-12-31 RX ADMIN — MELATONIN 3 MG: at 23:00

## 2019-12-31 RX ADMIN — IPRATROPIUM BROMIDE AND ALBUTEROL SULFATE 1 AMPULE: .5; 3 SOLUTION RESPIRATORY (INHALATION) at 21:47

## 2019-12-31 RX ADMIN — DOXYCYCLINE 100 MG: 100 INJECTION, POWDER, LYOPHILIZED, FOR SOLUTION INTRAVENOUS at 06:27

## 2019-12-31 RX ADMIN — ENOXAPARIN SODIUM 30 MG: 30 INJECTION SUBCUTANEOUS at 19:58

## 2019-12-31 RX ADMIN — IPRATROPIUM BROMIDE AND ALBUTEROL SULFATE 1 AMPULE: .5; 3 SOLUTION RESPIRATORY (INHALATION) at 08:48

## 2019-12-31 RX ADMIN — PANTOPRAZOLE SODIUM 40 MG: 40 INJECTION, POWDER, FOR SOLUTION INTRAVENOUS at 06:27

## 2019-12-31 RX ADMIN — METOPROLOL SUCCINATE 25 MG: 25 TABLET, EXTENDED RELEASE ORAL at 09:41

## 2019-12-31 RX ADMIN — MOMETASONE FUROATE AND FORMOTEROL FUMARATE DIHYDRATE 2 PUFF: 200; 5 AEROSOL RESPIRATORY (INHALATION) at 21:48

## 2019-12-31 RX ADMIN — SODIUM CHLORIDE, PRESERVATIVE FREE 10 ML: 5 INJECTION INTRAVENOUS at 09:42

## 2019-12-31 RX ADMIN — IPRATROPIUM BROMIDE AND ALBUTEROL SULFATE 1 AMPULE: .5; 3 SOLUTION RESPIRATORY (INHALATION) at 12:37

## 2019-12-31 RX ADMIN — MELATONIN 3 MG: at 00:05

## 2019-12-31 RX ADMIN — SODIUM CHLORIDE, PRESERVATIVE FREE 10 ML: 5 INJECTION INTRAVENOUS at 19:56

## 2019-12-31 RX ADMIN — ASPIRIN 81 MG 81 MG: 81 TABLET ORAL at 09:41

## 2019-12-31 ASSESSMENT — PAIN SCALES - GENERAL
PAINLEVEL_OUTOF10: 0

## 2019-12-31 NOTE — PROGRESS NOTES
Hospitalist Progress Note      PCP: Jorgito Weber    Date of Admission: 12/29/2019        Subjective: up to chair, improved SOB, no fever or chills. Medications:  Reviewed    Infusion Medications   Scheduled Medications    predniSONE  20 mg Oral Daily    Followed by   Marybeth Antonio ON 1/3/2020] predniSONE  15 mg Oral Daily    Followed by   Marybeth Antonio ON 1/6/2020] predniSONE  10 mg Oral Daily    Followed by   Marybeth Antonio ON 1/9/2020] predniSONE  5 mg Oral Daily    mometasone-formoterol  2 puff Inhalation BID    aspirin  81 mg Oral Daily    atorvastatin  40 mg Oral Nightly    metoprolol succinate  25 mg Oral Daily    pantoprazole  40 mg Intravenous QAM AC    ipratropium-albuterol  1 ampule Inhalation Q4H WA    doxycycline (VIBRAMYCIN) IV  100 mg Intravenous Q12H    sodium chloride flush  10 mL Intravenous 2 times per day    enoxaparin  30 mg Subcutaneous Nightly     PRN Meds: sodium chloride flush, ondansetron, acetaminophen, melatonin      Intake/Output Summary (Last 24 hours) at 12/31/2019 1052  Last data filed at 12/31/2019 0941  Gross per 24 hour   Intake 540 ml   Output 750 ml   Net -210 ml       Physical Exam Performed:    BP (!) 175/73   Pulse 80   Temp 97.5 °F (36.4 °C) (Oral)   Resp 17   Ht 4' 10\" (1.473 m)   Wt 79 lb 9.6 oz (36.1 kg)   SpO2 98%   BMI 16.64 kg/m²     General appearance: No apparent distress  Neck: Supple  Respiratory:  Diminished breath sounds  Cardiovascular: Regular rate and rhythm with normal S1/S2 without murmurs, rubs or gallops. Abdomen: Soft, non-tender  Musculoskeletal: No clubbing  Skin: Skin color, texture, turgor normal.  No rashes or lesions.   Neurologic:  No focal weakness   Psychiatric: Alert and oriented  Capillary Refill: Brisk,< 3 seconds   Peripheral Pulses: +2 palpable, equal bilaterally       Labs:   Recent Labs     12/29/19  1319 12/30/19  0532 12/31/19  0636   WBC 6.0 3.5* 11.6*   HGB 12.2 11.2* 11.0*   HCT 35.0* 32.7* 32.1*    179 194 Recent Labs     12/29/19  1319 12/30/19  0532 12/31/19  0636    133* 137   K 4.1 3.8 3.7    96* 99   CO2 20* 22 21   BUN 12 11 20   CREATININE 1.0 1.0 1.1   CALCIUM 9.5 9.2 9.3   PHOS  --  3.5  --      No results for input(s): AST, ALT, BILIDIR, BILITOT, ALKPHOS in the last 72 hours. No results for input(s): INR in the last 72 hours. Recent Labs     12/29/19  1319   TROPONINI <0.01       Urinalysis:      Lab Results   Component Value Date    NITRU NEGATIVE 08/14/2011    WBCUA 0-3 08/14/2011    BACTERIA 2+ 08/14/2011    RBCUA 0-2 08/14/2011    BLOODU MODERATE 08/14/2011    SPECGRAV 1.025 08/14/2011    GLUCOSEU NEGATIVE 08/14/2011       Radiology:  XR CHEST STANDARD (2 VW)   Final Result   No evidence of acute cardiopulmonary disease. Unchanged hyperinflation suggesting COPD. 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 COPD with acute exacerbation, iv steroids, duonebs, likely triggered by URI, pulmonary consulted. Slowly improving. 2. Chronic Hypoxic respiratory failure, due to above, oxygen, duonebs, monitor clinically, at risk for complications. Repiratory panel pending. 3. HTN currently controlled. 4. 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 5. GERD, Pantoprazole.        Diet: DIET GENERAL;  Code Status: Full Code      Tracey Hernandez MD

## 2019-12-31 NOTE — PROGRESS NOTES
Occupational Therapy   Occupational Therapy Initial Assessment    Date: 2019   Patient Name: Balaji Victor  MRN: [id number]     : 1940    Date of Service: 2019    Discharge Recommendations:  Home with assist PRN  OT Equipment Recommendations  Other: pt may benefit from shower chair for energy conservation for showers (though mostly sponge bathes), and possible reacher for energy conservation for dropped items     Balaji Victor scored a 20/24 on the [facility] ADL Inpatient form. At this time, no further OT is recommended upon discharge due to pt is near her baseline, and is supervision/mod I for ADLs and fxl transfers/mobility. Recommend patient returns to prior setting with prior services and assist prn. Assessment   Performance deficits / Impairments: Decreased ADL status; Decreased functional mobility ; Decreased endurance  Assessment: Pt is a 70 y.o. female admitted with SOB, diagnosed with COPD exacerbation. PTA, pt lives alone in a 1 level home. She reports independence with bathing, dressing, toileting, fxl transfers/mobility without device, and also reports independence with cooking and light cleaning. Pt has been having her daughters assist with laundry in the basement, and goes grocery shopping with her daughters. Pt reports she takes frequent rest breaks when SOB. Today, pt is functioning close to her baseline, requiring supervision to mod I for ADLs and fxl transfers/mobility, with cues to manage O2 line during fxl mobility. Pt will benefit from 1-2 more OT sessions while in the hospital to continue to address endurance for ADLs and safety with O2 line. Do not anticipate pt will require any home OT. Pt in agreement with plan. Treatment Diagnosis: impaired endurance, fxl transfers, ADLs  Prognosis: Good  Decision Making: Low Complexity  History: Pt is a 70 y.o. female admitted with SOB, diagnosed with COPD exacerbation. PTA, pt lives alone in a 1 level home.  She reports independence on the phone, goes out to lunch. Additional Comments: Denies recent falls, but reports she sits down when out of breath. Gets out of breath often with minimal activity, takes frequent rest breaks. Participated in pulmonary therapy 4 months, 2 years ago. Objective   Vision: Impaired  Vision Exceptions: Wears glasses at all times  Hearing: Exceptions to [facility] [address] [facility]  Hearing Exceptions: Hard of hearing/hearing concerns(has a hole in her right eardrum- had operation years ago that didn't work. Currently, left ear is \"blocked up from my cold\")      Orientation  Overall Orientation Status: Within Functional Limits     Balance  Sitting Balance: Independent  Standing Balance: (supervision/mod I)  Functional Mobility  Functional - Mobility Device: No device  Activity: To/from bathroom  Assist Level: Supervision  Functional Mobility Comments: Pt steady, no LOB. Pt did require VCs prn to manage her O2 line, thus requiring supervision for safety. Toilet Transfers  Toilet - Technique: Ambulating  Equipment Used: Grab bars  Toilet Transfer: Supervision;Modified independent  Toilet Transfers Comments: grab bar on right, comfort height commode  Wheelchair Bed Transfers  Wheelchair/Bed - Technique: Ambulating  Equipment Used: Bed;Other  Level of Asssistance: Supervision;Modified independent   Wheelchair Transfers Comments: sit>stand from bed, stand>sit recliner  ADL  Grooming: Supervision;Modified independent (stance at sink for oral care)  LE Dressing: Independent(seated in bed to change socks)  Toileting: (declines)  Additional Comments: Anticipate pt supervision to mod I for dressing, supervision bathing, independent feeding, and supervision/mod I full grooming sequence as demo'd by balance, endurance, SOB, and PLOF. Pt most limited by SOB.   Tone RUE  RUE Tone: Normotonic  Tone LUE  LUE Tone: Normotonic  Coordination  Movements Are Fluid And Coordinated: Yes     Bed mobility  Supine to Sit: Modified independent  Sit to Supine: 1036)  ADL Inpatient CMS G-Code Modifier : Clifford Bonilla (12/31/19 1036)    Goals  Short term goals  Time Frame for Short term goals: to be met by d/c  Short term goal 1: Pt will complete fxl transfers/mobility to participate in ADLs mod I, while managing O2 line without cues. Short term goal 2: Pt will complete toileting independent. Short term goal 3: Pt will complete dressing mod I. Short term goal 4: Pt will complete bathing mod I. Short term goal 5: Pt will tolerate standing x5-7 mins to participate in full grooming, with O2 sats remaining WFL.   Long term goals  Time Frame for Long term goals : same as STGs  Patient Goals   Patient goals : \"to be able to breathe when I walk\"       Therapy Time   Individual Concurrent Group Co-treatment   Time In 1007         Time Out 1041         Minutes 34         Timed Code Treatment Minutes: 19 Minutes       Let this serve as discharge note if discharged prior to next OT session    Jonathon Current, OTR/L #[id number]

## 2019-12-31 NOTE — PROGRESS NOTES
[email or web address]  Occupation: Retired  Type of occupation: owned a business  Leisure & Hobbies: has been restricted since COPD d/t getting out of breath. Used to enjoy shopping and walking. Pt reads, plays games on the computer, talks a lot on the phone, goes out to lunch. Additional Comments: Denies recent falls, but reports she sits down when out of breath. Gets out of breath often with minimal activity, takes frequent rest breaks. Participated in pulmonary therapy 4 months, 2 years ago. Objective    AROM RLE (degrees)  RLE AROM: WNL  RLE General AROM: Hip Flex, Knee Flex/Ext, and Ankle PF/DF WNL  AROM LLE (degrees)  LLE AROM : WNL  LLE General AROM: Hip Flex, Knee Flex/Ext, and Ankle PF/DF WNL  AROM RUE (degrees)  RUE AROM : WNL  RUE General AROM: Shoulder Flex, Elbow Flex/Ext WNL  AROM LUE (degrees)  LUE AROM : WNL  LUE General AROM: Shoulder Flex, Elbow Flex/Ext WNL     Strength RLE  Strength RLE: WNL  Comment: Hip Flex, Knee Ext, and Ankle DF grossly 4/5  Strength LLE  Strength LLE: WNL  Comment: Hip Flex, Knee Ext, and Ankle DF grossly 4/5  Strength RUE  Strength RUE: WNL  Comment: Shoulder Flex, Elbow Flex/Ext grossly 4/5  Strength LUE  Strength LUE: WNL  Comment: Shoulder Flex, Elbow Flex/Ext grossly 4/5     Tone RLE  RLE Tone: Normotonic  Tone LLE  LLE Tone: Normotonic  Motor Control  Gross Motor?: WFL     Bed mobility  Supine to Sit: Modified independent  Sit to Supine: Modified independent     Transfers  Sit to Stand: Supervision  Stand to sit: Supervision  Comment: Pt able to complete toilet transfer with supervision, no grab bar support. Ambulation  Ambulation?: Yes  Ambulation 1  Surface: level tile  Device: No Device  Other Apparatus: O2  Assistance: Supervision  Quality of Gait: Pt able to stand and ambulate multiple trials in room, short-distance, moving quickly without AD, turning multiple times R/L without LOB. She was limited in standing tolerance d/t fatigue, and SOB.    Distance: 50' x 2    Stairs/Curb  Stairs?: No      Plan   Plan  Times per week: 2-3x  Current Treatment Recommendations: Endurance Training, Home Exercise Program, Safety Education & Training, Equipment Evaluation, Education, & procurement  Safety Devices  Type of devices: All fall risk precautions in place, Bed alarm in place, Call light within reach, Left in bed  Restraints  Initially in place: No    AM-PAC Score  AM-PAC Inpatient Mobility Raw Score : 20 (12/31/19 1421)  AM-PAC Inpatient T-Scale Score : 47.67 (12/31/19 1421)  Mobility Inpatient CMS 0-100% Score: 35.83 (12/31/19 1421)  Mobility Inpatient CMS G-Code Modifier : CJ (12/31/19 1421)          Goals  Short term goals  Time Frame for Short term goals: In 2-3 days pt will perform  Short term goal 1: Bed mobility (I)  Short term goal 2: Transfers (I)  Short term goal 3: Ambulation 48' mod I, managing O2 line  Short term goal 4: Demonstrate independence with HEP  Long term goals  Time Frame for Long term goals : LTG = STG  Patient Goals   Patient goals : To return home today       Therapy Time   Individual Concurrent Group Co-treatment   Time In 1400         Time Out 1415         Minutes 15          Evaluation time: 15 min.         Bartolo Allan PT    Electronically signed by Bartolo Allan PT [id number] on 12/31/2019 at 2:22 PM

## 2019-12-31 NOTE — PROGRESS NOTES
[phone]     BMP:   Recent Labs     19  1319 19  0532    133*   K 4.1 3.8    96*   CO2 20* 22   PHOS  --  3.5   BUN 12 11   CREATININE 1.0 1.0     LIVER PROFILE: No results for input(s): AST, ALT, LIPASE, BILIDIR, BILITOT, ALKPHOS in the last 72 hours. Invalid input(s): AMYLASE,  ALB  PT/INR: No results for input(s): PROTIME, INR in the last 72 hours. APTT: No results for input(s): APTT in the last 72 hours. UA:No results for input(s): NITRITE, COLORU, PHUR, LABCAST, WBCUA, RBCUA, MUCUS, TRICHOMONAS, YEAST, BACTERIA, CLARITYU, SPECGRAV, LEUKOCYTESUR, UROBILINOGEN, BILIRUBINUR, BLOODU, GLUCOSEU, AMORPHOUS in the last 72 hours. Invalid input(s): Augie Glez  No results for input(s): PH, PCO2, PO2 in the last 72 hours. Films:  Chest imaging reports were reviewed and imaging was reviewed by me and showed no new films    VB.28     Chest X-ray:  Chest imaging was reviewed by me and showed hyperinflated lungs     I reviewed all the above labs and studies pertaining to this visit.        ASSESSMENT:  · Chronic Hypoxic Respiratory Failure  · COPD exacerbation  · Bronchitis   · Pulmonary Cachexia      PLAN:  · Titrate oxygen for saturations greater than or equal to 90%  · Duonebs q 4 hours  · Dulera q 12 hours  · DC Solumedrol  · Prednisone taper  · Doxycycline is reasonable. Treat for 10 days  · DVT prophylaxis with lovenox     Needs to ambulate more before going home. Wants to go home today.   Not sure if she is ready but if she is able to ambulate and do well, possibly     Lelo Boyle, DO  [facility]

## 2019-12-31 NOTE — CARE COORDINATION
INITIAL CASE MANAGEMENT ASSESSMENT (follow up)      PT/OT Recs: The Plan for Transition of Care is related to the following treatment goals:   PT-Patient scored a 20/24 on the AM-PAC short mobility form. She would benefit from continued therapy to improve her endurance. Anticipate safe return home with prn assist.     OT- No needs noted. Patient scored a 20/24 on the AM-PAC ADL Inpatient form. At this time, no further OT is recommended upon discharge due to pt is near her baseline, and is supervision/mod I for ADLs and fxl transfers/mobility. Recommend patient returns to prior setting with prior services and assist prn.     The Patient  was provided with a choice of provider and agrees   with the discharge plan. [x] Yes [] No    Freedom of choice list was provided with basic dialogue that supports the patient's individualized plan of care/goals, treatment preferences and shares the quality data associated with the providers. [x] Yes [] No    PLAN/COMMENTS:   Patient [facility] for physical therapy only. SW provided contact information for patient or family to call with any questions. SW will follow and assist as psychosocial needs arise. Respectfully submitted,    Fanny Philippe Parent TRUPTI HERNANDEZ  [facility]   [phone]    Electronically signed by TRUPTI Stevenson on 12/31/2019 at 4:11 PM

## 2019-12-31 NOTE — PLAN OF CARE
Problem: SAFETY  Goal: Free from intentional harm  Outcome: Ongoing     Problem: DAILY CARE  Goal: Daily care needs are met  Outcome: Ongoing     Problem: PAIN  Goal: Patient's pain/discomfort is manageable  Outcome: Ongoing     Problem: SKIN INTEGRITY  Goal: Skin integrity is maintained or improved  Outcome: Ongoing

## 2020-01-01 VITALS
OXYGEN SATURATION: 92 % | TEMPERATURE: 97.5 F | SYSTOLIC BLOOD PRESSURE: 125 MMHG | DIASTOLIC BLOOD PRESSURE: 72 MMHG | BODY MASS INDEX: 16.57 KG/M2 | RESPIRATION RATE: 16 BRPM | WEIGHT: 78.92 LBS | HEIGHT: 58 IN | HEART RATE: 102 BPM

## 2020-01-01 PROCEDURE — 2700000000 HC OXYGEN THERAPY PER DAY

## 2020-01-01 PROCEDURE — 94640 AIRWAY INHALATION TREATMENT: CPT

## 2020-01-01 PROCEDURE — 94761 N-INVAS EAR/PLS OXIMETRY MLT: CPT

## 2020-01-01 PROCEDURE — C9113 INJ PANTOPRAZOLE SODIUM, VIA: HCPCS | Performed by: INTERNAL MEDICINE

## 2020-01-01 PROCEDURE — 2580000003 HC RX 258: Performed by: INTERNAL MEDICINE

## 2020-01-01 PROCEDURE — 2500000003 HC RX 250 WO HCPCS: Performed by: INTERNAL MEDICINE

## 2020-01-01 PROCEDURE — 6360000002 HC RX W HCPCS: Performed by: INTERNAL MEDICINE

## 2020-01-01 PROCEDURE — 6370000000 HC RX 637 (ALT 250 FOR IP): Performed by: INTERNAL MEDICINE

## 2020-01-01 RX ORDER — PREDNISONE 1 MG/1
TABLET ORAL
Qty: 41 TABLET | Refills: 1 | Status: SHIPPED | OUTPATIENT
Start: 2020-01-01 | End: 2021-12-16

## 2020-01-01 RX ORDER — DOXYCYCLINE HYCLATE 100 MG
100 TABLET ORAL 2 TIMES DAILY
Qty: 8 TABLET | Refills: 0 | Status: SHIPPED | OUTPATIENT
Start: 2020-01-01 | End: 2020-01-05

## 2020-01-01 RX ADMIN — PANTOPRAZOLE SODIUM 40 MG: 40 INJECTION, POWDER, FOR SOLUTION INTRAVENOUS at 06:15

## 2020-01-01 RX ADMIN — MOMETASONE FUROATE AND FORMOTEROL FUMARATE DIHYDRATE 2 PUFF: 200; 5 AEROSOL RESPIRATORY (INHALATION) at 10:02

## 2020-01-01 RX ADMIN — ASPIRIN 81 MG 81 MG: 81 TABLET ORAL at 09:17

## 2020-01-01 RX ADMIN — SODIUM CHLORIDE, PRESERVATIVE FREE 10 ML: 5 INJECTION INTRAVENOUS at 09:18

## 2020-01-01 RX ADMIN — IPRATROPIUM BROMIDE AND ALBUTEROL SULFATE 1 AMPULE: .5; 3 SOLUTION RESPIRATORY (INHALATION) at 12:32

## 2020-01-01 RX ADMIN — PREDNISONE 20 MG: 20 TABLET ORAL at 09:18

## 2020-01-01 RX ADMIN — IPRATROPIUM BROMIDE AND ALBUTEROL SULFATE 1 AMPULE: .5; 3 SOLUTION RESPIRATORY (INHALATION) at 10:02

## 2020-01-01 RX ADMIN — DOXYCYCLINE 100 MG: 100 INJECTION, POWDER, LYOPHILIZED, FOR SOLUTION INTRAVENOUS at 06:15

## 2020-01-01 RX ADMIN — METOPROLOL SUCCINATE 25 MG: 25 TABLET, EXTENDED RELEASE ORAL at 09:18

## 2020-01-01 ASSESSMENT — PAIN SCALES - GENERAL: PAINLEVEL_OUTOF10: 0

## 2020-01-01 NOTE — PROGRESS NOTES
IV and tele removed. Discharge paperwork discussed and signed. Paper scripts given. Denies additional questions. Awaiting for ride home.

## 2020-01-01 NOTE — PLAN OF CARE
Problem: SAFETY  Goal: Free from accidental physical injury  1/1/2020 1014 by Sintia Marion RN  Outcome: Ongoing  ID band in place. Problem: DAILY CARE  Goal: Daily care needs are met  Outcome: Ongoing   Assistance provided as needed     Problem: PAIN  Goal: Patient's pain/discomfort is manageable  Outcome: Ongoing   No complaints of pain     Problem: SKIN INTEGRITY  Goal: Skin integrity is maintained or improved  Outcome: Ongoing   Skin assessment completed every shift per protocol. Pt assessed for incontinence, appropriate barrier cream applied as needed. Pt encouraged to turn/rotate every 2 hours. Assistance provided if pt unable to do so themselves. Will continue to monitor. Problem: DISCHARGE BARRIERS  Goal: Patient's continuum of care needs are met  1/1/2020 1014 by Sintia Marion RN  Outcome: Ongoing  SW involved in D/C planning      Problem: Falls - Risk of:  Goal: Will remain free from falls  Description  Will remain free from falls  Outcome: Ongoing  Patient is wearing nonskid socks. Bed is alarmed, locked, and in lowest position. Room is free of clutter. Call light is within reach and used appropriately. Fall risk assessed per protocol. Will continue to monitor. Problem: Nutrition  Goal: Optimal nutrition therapy  1/1/2020 1014 by Sintia Marion, RN  Outcome: Ongoing  Fair appetite      Problem: Breathing Pattern - Ineffective:  Goal: Ability to achieve and maintain a regular respiratory rate will improve  Description  Ability to achieve and maintain a regular respiratory rate will improve  1/1/2020 1014 by Sintia Marion, RN  Outcome: Ongoing  SOB with exertion.

## 2020-01-01 NOTE — DISCHARGE SUMMARY
ipratropium (ATROVENT) 0.02 % nebulizer solution Take 2.5 mLs by nebulization 4 times daily as needed for Wheezing Dispense 120 vials, with 3 refills  Qty: 2.5 mL, Refills: 3      predniSONE (DELTASONE) 5 MG tablet 20mg daily x 4 days, then 15mg daily x 4 days, then 10mg daily x 3 days, then 5mg daily x 3 days then STOP  Qty: 41 tablet, Refills: 1           Current Discharge Medication List        Current Discharge Medication List      CONTINUE these medications which have NOT CHANGED    Details   metoprolol succinate (TOPROL XL) 25 MG extended release tablet Take 25 mg by mouth daily      amLODIPine (NORVASC) 5 MG tablet Take 5 mg by mouth daily      atorvastatin (LIPITOR) 40 MG tablet Take 40 mg by mouth daily      aspirin 81 MG tablet Take 81 mg by mouth daily      pantoprazole (PROTONIX) 40 MG tablet Take 40 mg by mouth daily      umeclidinium-vilanterol (ANORO ELLIPTA) 62.5-25 MCG/INH AEPB inhaler Inhale 1 puff into the lungs daily      ALBUTEROL SULFATE HFA IN Inhale 2 puffs into the lungs           Current Discharge Medication List      STOP taking these medications       pantoprazole (PROTONIX) 40 MG injection Comments:   Reason for Stopping:                   Procedures: none    Assessment on Discharge: Stable, improved     Discharge ROS:  A complete review of systems was asked and negative except for some cough, less SOB    Discharge Exam:  /72   Pulse 102   Temp 97.5 °F (36.4 °C) (Oral)   Resp 16   Ht 4' 10\" (1.473 m)   Wt 78 lb 14.8 oz (35.8 kg)   SpO2 92%   BMI 16.50 kg/m²     Gen: NAD  HEENT: NC/AT, moist mucous membranes, no oropharyngeal erythema or exudate  Neck: supple, trachea midline, no anterior cervical or SC LAD  Heart:  Normal s1/s2, RRR, no murmurs, gallops, or rubs.  no leg edema  Lungs:  Some wheeze bilaterally, some wheeze,no rales or rhonchi, no use of accessory muscles  Abd: bowel sounds present, soft, nontender, nondistended, no masses  Extrem:  No clubbing, cyanosis,

## 2020-01-01 NOTE — PLAN OF CARE
Problem: SAFETY  Goal: Free from accidental physical injury  Outcome: Ongoing  Goal: Free from intentional harm  Outcome: Ongoing     Problem: DISCHARGE BARRIERS  Goal: Patient's continuum of care needs are met  Outcome: Ongoing     Problem: Nutrition  Goal: Optimal nutrition therapy  1/1/2020 0119 by Nery Rodriguez RN  Outcome: Ongoing       Problem: Breathing Pattern - Ineffective:  Goal: Ability to achieve and maintain a regular respiratory rate will improve  Description  Ability to achieve and maintain a regular respiratory rate will improve  Outcome: Ongoing

## 2020-01-21 ENCOUNTER — OFFICE VISIT (OUTPATIENT)
Dept: PULMONOLOGY | Age: 80
End: 2020-01-21
Payer: MEDICARE

## 2020-01-21 VITALS
BODY MASS INDEX: 17.51 KG/M2 | DIASTOLIC BLOOD PRESSURE: 64 MMHG | OXYGEN SATURATION: 95 % | HEIGHT: 58 IN | RESPIRATION RATE: 16 BRPM | WEIGHT: 83.4 LBS | HEART RATE: 77 BPM | SYSTOLIC BLOOD PRESSURE: 120 MMHG | TEMPERATURE: 97 F

## 2020-01-21 PROBLEM — J44.1 COPD EXACERBATION (HCC): Status: RESOLVED | Noted: 2019-12-29 | Resolved: 2020-01-21

## 2020-01-21 PROCEDURE — 1090F PRES/ABSN URINE INCON ASSESS: CPT | Performed by: INTERNAL MEDICINE

## 2020-01-21 PROCEDURE — G8427 DOCREV CUR MEDS BY ELIG CLIN: HCPCS | Performed by: INTERNAL MEDICINE

## 2020-01-21 PROCEDURE — 1111F DSCHRG MED/CURRENT MED MERGE: CPT | Performed by: INTERNAL MEDICINE

## 2020-01-21 PROCEDURE — 1036F TOBACCO NON-USER: CPT | Performed by: INTERNAL MEDICINE

## 2020-01-21 PROCEDURE — 1123F ACP DISCUSS/DSCN MKR DOCD: CPT | Performed by: INTERNAL MEDICINE

## 2020-01-21 PROCEDURE — G8400 PT W/DXA NO RESULTS DOC: HCPCS | Performed by: INTERNAL MEDICINE

## 2020-01-21 PROCEDURE — 99215 OFFICE O/P EST HI 40 MIN: CPT | Performed by: INTERNAL MEDICINE

## 2020-01-21 PROCEDURE — 3023F SPIROM DOC REV: CPT | Performed by: INTERNAL MEDICINE

## 2020-01-21 PROCEDURE — G8482 FLU IMMUNIZE ORDER/ADMIN: HCPCS | Performed by: INTERNAL MEDICINE

## 2020-01-21 PROCEDURE — G8926 SPIRO NO PERF OR DOC: HCPCS | Performed by: INTERNAL MEDICINE

## 2020-01-21 PROCEDURE — 4040F PNEUMOC VAC/ADMIN/RCVD: CPT | Performed by: INTERNAL MEDICINE

## 2020-01-21 PROCEDURE — G8419 CALC BMI OUT NRM PARAM NOF/U: HCPCS | Performed by: INTERNAL MEDICINE

## 2020-01-21 SDOH — HEALTH STABILITY: MENTAL HEALTH: HOW MANY STANDARD DRINKS CONTAINING ALCOHOL DO YOU HAVE ON A TYPICAL DAY?: 1 OR 2

## 2020-01-21 SDOH — HEALTH STABILITY: MENTAL HEALTH: HOW OFTEN DO YOU HAVE A DRINK CONTAINING ALCOHOL?: MONTHLY OR LESS

## 2020-01-21 NOTE — PROGRESS NOTES
lungs       No current facility-administered medications for this visit. Data Reviewed:   CBC and Renal reviewed  Last CBC  Lab Results   Component Value Date    WBC 11.6 12/31/2019    RBC 3.21 12/31/2019    HGB 11.0 12/31/2019    .3 12/31/2019     12/31/2019     Last Renal  Lab Results   Component Value Date     12/31/2019    K 3.7 12/31/2019    K 4.1 12/29/2019    CL 99 12/31/2019    CO2 21 12/31/2019    CO2 22 12/30/2019    CO2 20 12/29/2019    BUN 20 12/31/2019    CREATININE 1.1 12/31/2019    GLUCOSE 121 12/31/2019    CALCIUM 9.3 12/31/2019       Last ABG  POC Blood Gas: No results found for: POCPH, POCPCO2, POCPO2, POCHCO3, NBEA, CSCR1KOV  No results for input(s): PH, PCO2, PO2, HCO3, BE, O2SAT in the last 72 hours. Radiology Review:  Pertinent images / reports were reviewed as a part of this visit. chest x-ray personally reviewed by me, interpretation as follows:  -Expanded lung fields bilaterally, no acute airspace process    CTPA:   Results for orders placed during the hospital encounter of 07/04/19   CT CHEST PULMONARY EMBOLISM W CONTRAST    Narrative EXAMINATION:  CTA OF THE CHEST 7/4/2019 5:09 pm    TECHNIQUE:  CTA of the chest was performed after the administration of intravenous  contrast.  Multiplanar reformatted images are provided for review. MIP  images are provided for review. Dose modulation, iterative reconstruction,  and/or weight based adjustment of the mA/kV was utilized to reduce the  radiation dose to as low as reasonably achievable. COMPARISON:  8/15/2011    HISTORY:  ORDERING SYSTEM PROVIDED HISTORY: left lateral rib/chest pain  TECHNOLOGIST PROVIDED HISTORY:  Reason for Exam: Shortness of Breath (with ambulation 83% on RA  Acuity: Acute  Type of Exam: Initial    FINDINGS:  Pulmonary Arteries: Pulmonary arteries are adequately opacified for  evaluation. No evidence of intraluminal filling defect to suggest pulmonary  embolism.   Main pulmonary artery is normal in caliber. Mediastinum: The visualized thyroid is unremarkable. There is no  pathologically enlarged lymphadenopathy. There is atherosclerotic disease of  the thoracic aorta, without evidence of aneurysm or dissection. There is  mild coronary atherosclerosis. No pericardial abnormality is identified. Lungs/pleura: The central airways are patent. There is a background of  moderate to severe emphysema. There is chronic biapical pleural-parenchymal  scarring. There are curvilinear bands of parenchymal scar within the right  lower lobe, similar to the prior study of 2011. There is a somewhat  nonspecific reticulonodular focus of opacity within the lateral basal segment  of the left lower lobe, which likely reflects a focus of pleural and  parenchymal scar. However, there is a somewhat nodular component measuring  14 x 7 mm on image 43 of series 2. This is new from the study of 8/15/2011. No additional focus of airspace consolidation is identified. There is no  evidence of a pneumothorax or pleural effusion. No additional suspicious  pulmonary nodule or mass is identified. Upper Abdomen: The visualized portions of the adrenal glands are  unremarkable. There is a stable 11 mm cyst within the lateral segment of the  left hepatic lobe, unchanged from prior exam.  The remainder of the upper  abdomen is unremarkable. Soft Tissues/Bones: There is mild degenerative change throughout the thoracic  spine. There is no acute abnormality of the left ribs. There are chronic  healed left rib fractures. No osteolytic or osteoblastic lesion is seen. Impression 1. No CT evidence of a pulmonary embolism. 2. Atherosclerotic disease of the thoracic aorta, without evidence of  aneurysm or dissection. 3. Chronic emphysema, without acute intrapulmonary findings.   4. Nonspecific reticulonodular opacity within the lateral basal segment of  the left lower lobe with a somewhat nodular component

## 2020-01-21 NOTE — ASSESSMENT & PLAN NOTE
-Difficulty gaining weight in setting of advanced COPD likely playing a large role in her malnutrition.

## 2021-09-02 ENCOUNTER — TELEPHONE (OUTPATIENT)
Dept: PULMONOLOGY | Age: 81
End: 2021-09-02

## 2021-09-02 RX ORDER — PREDNISONE 20 MG/1
20 TABLET ORAL DAILY
Qty: 7 TABLET | Refills: 0 | Status: SHIPPED | OUTPATIENT
Start: 2021-09-02 | End: 2021-09-09

## 2021-09-02 NOTE — TELEPHONE ENCOUNTER
Patient's grand daughter, Graeme Lubin called stating pt's O2 keeps dropping to 88% on exertion which in turn the patient had to raise her O2 to 3L. OVC covid test was negative. No cough just SOB on exertion.    She didn't know if you could Rx something before the patient started feeling worse

## 2021-10-26 ENCOUNTER — HOSPITAL ENCOUNTER (OUTPATIENT)
Dept: ULTRASOUND IMAGING | Age: 81
Discharge: HOME | End: 2021-10-26
Payer: MEDICARE

## 2021-10-26 DIAGNOSIS — N39.0 URINARY TRACT INFECTION WITHOUT HEMATURIA, SITE UNSPECIFIED: ICD-10-CM

## 2021-10-26 PROCEDURE — 76770 US EXAM ABDO BACK WALL COMP: CPT

## 2021-12-16 ENCOUNTER — HOSPITAL ENCOUNTER (OUTPATIENT)
Age: 81
Setting detail: OBSERVATION
Discharge: HOME | End: 2021-12-18
Attending: EMERGENCY MEDICINE | Admitting: INTERNAL MEDICINE
Payer: MEDICARE

## 2021-12-16 ENCOUNTER — APPOINTMENT (OUTPATIENT)
Dept: GENERAL RADIOLOGY | Age: 81
End: 2021-12-16
Payer: MEDICARE

## 2021-12-16 DIAGNOSIS — J96.02 ACUTE RESPIRATORY ACIDOSIS (HCC): Primary | ICD-10-CM

## 2021-12-16 DIAGNOSIS — R42 LIGHTHEADEDNESS: ICD-10-CM

## 2021-12-16 LAB
A/G RATIO: 1.2 (ref 1.1–2.2)
ALBUMIN SERPL-MCNC: 3.9 G/DL (ref 3.4–5)
ALP BLD-CCNC: 81 U/L (ref 40–129)
ALT SERPL-CCNC: 11 U/L (ref 10–40)
ANION GAP SERPL CALCULATED.3IONS-SCNC: 12 MMOL/L (ref 3–16)
AST SERPL-CCNC: 17 U/L (ref 15–37)
BACTERIA: ABNORMAL /HPF
BASE EXCESS VENOUS: -1.7 MMOL/L
BASE EXCESS VENOUS: 1.1 MMOL/L
BASOPHILS ABSOLUTE: 0 K/UL (ref 0–0.2)
BASOPHILS RELATIVE PERCENT: 0.2 %
BILIRUB SERPL-MCNC: 0.3 MG/DL (ref 0–1)
BILIRUBIN URINE: NEGATIVE
BLOOD, URINE: ABNORMAL
BUN BLDV-MCNC: 17 MG/DL (ref 7–20)
CALCIUM SERPL-MCNC: 9.3 MG/DL (ref 8.3–10.6)
CARBOXYHEMOGLOBIN: 0.9 %
CARBOXYHEMOGLOBIN: 1.1 %
CHLORIDE BLD-SCNC: 100 MMOL/L (ref 99–110)
CLARITY: ABNORMAL
CO2: 27 MMOL/L (ref 21–32)
COLOR: YELLOW
CREAT SERPL-MCNC: 1 MG/DL (ref 0.6–1.2)
EOSINOPHILS ABSOLUTE: 0.1 K/UL (ref 0–0.6)
EOSINOPHILS RELATIVE PERCENT: 0.8 %
EPITHELIAL CELLS, UA: 21 /HPF (ref 0–5)
GFR AFRICAN AMERICAN: >60
GFR NON-AFRICAN AMERICAN: 53
GLUCOSE BLD-MCNC: 111 MG/DL (ref 70–99)
GLUCOSE URINE: NEGATIVE MG/DL
HCO3 VENOUS: 26 MMOL/L (ref 23–29)
HCO3 VENOUS: 29 MMOL/L (ref 23–29)
HCT VFR BLD CALC: 33.3 % (ref 36–48)
HEMOGLOBIN: 11 G/DL (ref 12–16)
HYALINE CASTS: 1 /LPF (ref 0–8)
KETONES, URINE: NEGATIVE MG/DL
LEUKOCYTE ESTERASE, URINE: ABNORMAL
LYMPHOCYTES ABSOLUTE: 0.7 K/UL (ref 1–5.1)
LYMPHOCYTES RELATIVE PERCENT: 9 %
MCH RBC QN AUTO: 32.2 PG (ref 26–34)
MCHC RBC AUTO-ENTMCNC: 33 G/DL (ref 31–36)
MCV RBC AUTO: 97.6 FL (ref 80–100)
METHEMOGLOBIN VENOUS: 0.6 %
METHEMOGLOBIN VENOUS: 0.7 %
MICROSCOPIC EXAMINATION: YES
MONOCYTES ABSOLUTE: 1 K/UL (ref 0–1.3)
MONOCYTES RELATIVE PERCENT: 13.1 %
NEUTROPHILS ABSOLUTE: 6 K/UL (ref 1.7–7.7)
NEUTROPHILS RELATIVE PERCENT: 76.9 %
NITRITE, URINE: NEGATIVE
O2 SAT, VEN: 20 %
O2 SAT, VEN: 36 %
O2 THERAPY: ABNORMAL
O2 THERAPY: ABNORMAL
PCO2, VEN: 59.2 MMHG (ref 40–50)
PCO2, VEN: 60.1 MMHG (ref 40–50)
PDW BLD-RTO: 12.9 % (ref 12.4–15.4)
PH UA: 7.5 (ref 5–8)
PH VENOUS: 7.25 (ref 7.35–7.45)
PH VENOUS: 7.29 (ref 7.35–7.45)
PLATELET # BLD: 206 K/UL (ref 135–450)
PMV BLD AUTO: 7.7 FL (ref 5–10.5)
PO2, VEN: <30 MMHG
PO2, VEN: <30 MMHG
POTASSIUM REFLEX MAGNESIUM: 4.3 MMOL/L (ref 3.5–5.1)
PRO-BNP: 719 PG/ML (ref 0–449)
PROTEIN UA: NEGATIVE MG/DL
RBC # BLD: 3.41 M/UL (ref 4–5.2)
RBC UA: 2 /HPF (ref 0–4)
SARS-COV-2, NAAT: NOT DETECTED
SODIUM BLD-SCNC: 139 MMOL/L (ref 136–145)
SPECIFIC GRAVITY UA: 1.01 (ref 1–1.03)
TCO2 CALC VENOUS: 28 MMOL/L
TCO2 CALC VENOUS: 30 MMOL/L
TOTAL PROTEIN: 7.2 G/DL (ref 6.4–8.2)
TROPONIN: <0.01 NG/ML
URINE REFLEX TO CULTURE: YES
URINE TYPE: ABNORMAL
UROBILINOGEN, URINE: 0.2 E.U./DL
WBC # BLD: 7.8 K/UL (ref 4–11)
WBC UA: 14 /HPF (ref 0–5)

## 2021-12-16 PROCEDURE — 6370000000 HC RX 637 (ALT 250 FOR IP): Performed by: INTERNAL MEDICINE

## 2021-12-16 PROCEDURE — 2700000000 HC OXYGEN THERAPY PER DAY

## 2021-12-16 PROCEDURE — 82803 BLOOD GASES ANY COMBINATION: CPT

## 2021-12-16 PROCEDURE — 81001 URINALYSIS AUTO W/SCOPE: CPT

## 2021-12-16 PROCEDURE — G0378 HOSPITAL OBSERVATION PER HR: HCPCS

## 2021-12-16 PROCEDURE — 83880 ASSAY OF NATRIURETIC PEPTIDE: CPT

## 2021-12-16 PROCEDURE — 80053 COMPREHEN METABOLIC PANEL: CPT

## 2021-12-16 PROCEDURE — 6360000002 HC RX W HCPCS: Performed by: INTERNAL MEDICINE

## 2021-12-16 PROCEDURE — 71045 X-RAY EXAM CHEST 1 VIEW: CPT

## 2021-12-16 PROCEDURE — 85025 COMPLETE CBC W/AUTO DIFF WBC: CPT

## 2021-12-16 PROCEDURE — 93005 ELECTROCARDIOGRAM TRACING: CPT | Performed by: EMERGENCY MEDICINE

## 2021-12-16 PROCEDURE — 36415 COLL VENOUS BLD VENIPUNCTURE: CPT

## 2021-12-16 PROCEDURE — 2580000003 HC RX 258: Performed by: EMERGENCY MEDICINE

## 2021-12-16 PROCEDURE — 94640 AIRWAY INHALATION TREATMENT: CPT

## 2021-12-16 PROCEDURE — 87635 SARS-COV-2 COVID-19 AMP PRB: CPT

## 2021-12-16 PROCEDURE — 94760 N-INVAS EAR/PLS OXIMETRY 1: CPT

## 2021-12-16 PROCEDURE — 87086 URINE CULTURE/COLONY COUNT: CPT

## 2021-12-16 PROCEDURE — 84484 ASSAY OF TROPONIN QUANT: CPT

## 2021-12-16 PROCEDURE — 99285 EMERGENCY DEPT VISIT HI MDM: CPT

## 2021-12-16 RX ORDER — PROMETHAZINE HYDROCHLORIDE 25 MG/1
12.5 TABLET ORAL EVERY 6 HOURS PRN
Status: DISCONTINUED | OUTPATIENT
Start: 2021-12-16 | End: 2021-12-18 | Stop reason: HOSPADM

## 2021-12-16 RX ORDER — ONDANSETRON 2 MG/ML
4 INJECTION INTRAMUSCULAR; INTRAVENOUS EVERY 6 HOURS PRN
Status: DISCONTINUED | OUTPATIENT
Start: 2021-12-16 | End: 2021-12-18 | Stop reason: HOSPADM

## 2021-12-16 RX ORDER — POTASSIUM CHLORIDE 20 MEQ/1
40 TABLET, EXTENDED RELEASE ORAL PRN
Status: DISCONTINUED | OUTPATIENT
Start: 2021-12-16 | End: 2021-12-18 | Stop reason: HOSPADM

## 2021-12-16 RX ORDER — MAGNESIUM SULFATE IN WATER 40 MG/ML
2000 INJECTION, SOLUTION INTRAVENOUS PRN
Status: DISCONTINUED | OUTPATIENT
Start: 2021-12-16 | End: 2021-12-18 | Stop reason: HOSPADM

## 2021-12-16 RX ORDER — OMEPRAZOLE 40 MG/1
1 CAPSULE, DELAYED RELEASE ORAL DAILY
COMMUNITY
Start: 2021-10-20

## 2021-12-16 RX ORDER — METOPROLOL SUCCINATE 25 MG/1
25 TABLET, EXTENDED RELEASE ORAL DAILY
Status: DISCONTINUED | OUTPATIENT
Start: 2021-12-17 | End: 2021-12-18 | Stop reason: HOSPADM

## 2021-12-16 RX ORDER — SODIUM CHLORIDE 0.9 % (FLUSH) 0.9 %
10 SYRINGE (ML) INJECTION PRN
Status: DISCONTINUED | OUTPATIENT
Start: 2021-12-16 | End: 2021-12-18 | Stop reason: HOSPADM

## 2021-12-16 RX ORDER — AMLODIPINE BESYLATE 5 MG/1
5 TABLET ORAL DAILY
Status: DISCONTINUED | OUTPATIENT
Start: 2021-12-17 | End: 2021-12-18 | Stop reason: HOSPADM

## 2021-12-16 RX ORDER — ACETAMINOPHEN 650 MG/1
650 SUPPOSITORY RECTAL EVERY 6 HOURS PRN
Status: DISCONTINUED | OUTPATIENT
Start: 2021-12-16 | End: 2021-12-18 | Stop reason: HOSPADM

## 2021-12-16 RX ORDER — POTASSIUM CHLORIDE 7.45 MG/ML
10 INJECTION INTRAVENOUS PRN
Status: DISCONTINUED | OUTPATIENT
Start: 2021-12-16 | End: 2021-12-18 | Stop reason: HOSPADM

## 2021-12-16 RX ORDER — ATORVASTATIN CALCIUM 40 MG/1
40 TABLET, FILM COATED ORAL DAILY
Status: DISCONTINUED | OUTPATIENT
Start: 2021-12-17 | End: 2021-12-18 | Stop reason: HOSPADM

## 2021-12-16 RX ORDER — SODIUM CHLORIDE 0.9 % (FLUSH) 0.9 %
10 SYRINGE (ML) INJECTION EVERY 12 HOURS SCHEDULED
Status: DISCONTINUED | OUTPATIENT
Start: 2021-12-17 | End: 2021-12-18 | Stop reason: HOSPADM

## 2021-12-16 RX ORDER — PANTOPRAZOLE SODIUM 40 MG/1
40 TABLET, DELAYED RELEASE ORAL
Status: DISCONTINUED | OUTPATIENT
Start: 2021-12-17 | End: 2021-12-18 | Stop reason: HOSPADM

## 2021-12-16 RX ORDER — ACETAMINOPHEN 325 MG/1
650 TABLET ORAL EVERY 6 HOURS PRN
Status: DISCONTINUED | OUTPATIENT
Start: 2021-12-16 | End: 2021-12-18 | Stop reason: HOSPADM

## 2021-12-16 RX ORDER — PREDNISONE 20 MG/1
40 TABLET ORAL DAILY
Status: DISCONTINUED | OUTPATIENT
Start: 2021-12-17 | End: 2021-12-18 | Stop reason: HOSPADM

## 2021-12-16 RX ORDER — SODIUM CHLORIDE 9 MG/ML
25 INJECTION, SOLUTION INTRAVENOUS PRN
Status: DISCONTINUED | OUTPATIENT
Start: 2021-12-16 | End: 2021-12-18 | Stop reason: HOSPADM

## 2021-12-16 RX ORDER — ASPIRIN 81 MG/1
81 TABLET, CHEWABLE ORAL DAILY
Status: DISCONTINUED | OUTPATIENT
Start: 2021-12-17 | End: 2021-12-18 | Stop reason: HOSPADM

## 2021-12-16 RX ORDER — 0.9 % SODIUM CHLORIDE 0.9 %
1000 INTRAVENOUS SOLUTION INTRAVENOUS ONCE
Status: COMPLETED | OUTPATIENT
Start: 2021-12-16 | End: 2021-12-16

## 2021-12-16 RX ADMIN — ALBUTEROL SULFATE 5 MG: 2.5 SOLUTION RESPIRATORY (INHALATION) at 21:21

## 2021-12-16 RX ADMIN — SODIUM CHLORIDE 1000 ML: 9 INJECTION, SOLUTION INTRAVENOUS at 15:57

## 2021-12-16 RX ADMIN — GLYCOPYRROLATE AND FORMOTEROL FUMARATE 2 PUFF: 9; 4.8 AEROSOL, METERED RESPIRATORY (INHALATION) at 21:21

## 2021-12-16 RX ADMIN — IPRATROPIUM BROMIDE 0.5 MG: 0.5 SOLUTION RESPIRATORY (INHALATION) at 21:19

## 2021-12-16 ASSESSMENT — PAIN SCALES - GENERAL
PAINLEVEL_OUTOF10: 0
PAINLEVEL_OUTOF10: 0

## 2021-12-16 NOTE — ED PROVIDER NOTES
CHIEF COMPLAINT  Dizziness (Started at 7 this morning, states she feels like she cant focus)      HISTORY OF PRESENT ILLNESS  Deisy Downs is a 80 y.o. female who  has a past medical history of COPD (chronic obstructive pulmonary disease) ([address] COPD exacerbation ([address] GERD (gastroesophageal reflux disease), Hyperlipidemia, and Hypertension. presents to the ED complaining of intermittent lightheadedness when going from sitting to standing has been ongoing since this morning at 7 AM when she woke up. Patient denies any numbness or weakness. Denies any headache or vision changes. Does have history of COPD and wears 2 L nasal cannula at baseline. No increased oxygen use at home. States she has had a slight cough. Denies any sputum production. No fevers or chills. No nausea or vomiting. No chest pain. Patient also states that she feels like she cannot focus today. Denies any vision changes or visual deficits. Normal urinary and bowel habits. No other complaints, modifying factors or associated symptoms. Nursing notes reviewed. Past Medical History:   Diagnosis Date    COPD (chronic obstructive pulmonary disease) ([address]     COPD exacerbation ([address] 2019    GERD (gastroesophageal reflux disease)     Hyperlipidemia     Hypertension      Past Surgical History:   Procedure Laterality Date    SHOULDER SURGERY       History reviewed. No pertinent family history. Social History     Socioeconomic History    Marital status:      Spouse name: Not on file    Number of children: Not on file    Years of education: Not on file    Highest education level: Not on file   Occupational History    Not on file   Tobacco Use    Smoking status: Former Smoker     Years: 50.00     Types: Cigarettes     Quit date: 2017     Years since quittin.9    Smokeless tobacco: Never Used   Substance and Sexual Activity    Alcohol use:  Yes     Alcohol/week: 1.0 standard drink     Types: 1 Cans of beer per week    Drug use: Never    Sexual activity: Not on file   Other Topics Concern    Not on file   Social History Narrative    Not on file     Social Determinants of Health     Financial Resource Strain:     Difficulty of Paying Living Expenses: Not on file   Food Insecurity:     Worried About Running Out of Food in the Last Year: Not on file    Janiya of Food in the Last Year: Not on file   Transportation Needs:     Lack of Transportation (Medical): Not on file    Lack of Transportation (Non-Medical): Not on file   Physical Activity:     Days of Exercise per Week: Not on file    Minutes of Exercise per Session: Not on file   Stress:     Feeling of Stress : Not on file   Social Connections:     Frequency of Communication with Friends and Family: Not on file    Frequency of Social Gatherings with Friends and Family: Not on file    Attends [employer] Services: Not on file    Active Member of [address] [place] [address] [email or web address] or Organizations: Not on file    Attends Club or Organization Meetings: Not on file    Marital Status: Not on file   Intimate Partner Violence:     Fear of Current or Ex-Partner: Not on file    Emotionally Abused: Not on file    Physically Abused: Not on file    Sexually Abused: Not on file   Housing Stability:     Unable to Pay for Housing in the Last Year: Not on file    Number of [place] in the Last Year: Not on file    Unstable Housing in the Last Year: Not on file     No current facility-administered medications for this encounter.      Current Outpatient Medications   Medication Sig Dispense Refill    omeprazole (PRILOSEC) 40 MG delayed release capsule Take 1 capsule by mouth daily      OXYGEN Inhale 2 L into the lungs       albuterol (PROVENTIL) (5 MG/ML) 0.5% nebulizer solution Take 1 mL by nebulization 4 times daily as needed for Wheezing 120 each 3    ipratropium (ATROVENT) 0.02 % nebulizer solution Take 2.5 mLs by nebulization 4 times daily as needed for Wheezing Dispense 120 vials, with 3 refills 2.5 mL 3    metoprolol succinate (TOPROL XL) 25 MG extended release tablet Take 25 mg by mouth daily      amLODIPine (NORVASC) 5 MG tablet Take 5 mg by mouth daily      atorvastatin (LIPITOR) 40 MG tablet Take 40 mg by mouth daily      aspirin 81 MG tablet Take 81 mg by mouth daily      umeclidinium-vilanterol (ANORO ELLIPTA) 62.5-25 MCG/INH AEPB inhaler Inhale 1 puff into the lungs daily      ALBUTEROL SULFATE HFA IN Inhale 2 puffs into the lungs       Allergies   Allergen Reactions    Sulfamethoxazole-Trimethoprim Other (See Comments)     \"I can't remember what the reaction was\"         REVIEW OF SYSTEMS  10 systems reviewed, pertinent positives per HPI otherwise noted to be negative    PHYSICAL EXAM  /67   Pulse 79   Temp 98.2 °F (36.8 °C) (Temporal)   Resp 22   SpO2 96%      CONSTITUTIONAL: AOx4, cooperative with exam, afebrile   HEAD: normocephalic, atraumatic   EYES: PERRL, EOMI, anicteric sclera   ENT: Moist mucous membranes, uvula midline   NECK: Supple, symmetric, trachea midline   BACK: Symmetric, no deformity   LUNGS: Bilateral breath sounds, CTAB, no rales/ronchi/wheezes   CARDIOVASCULAR: RRR, normal S1/S2, no m/r/g, 2+ pulses throughout   ABDOMEN: Soft, non-tender, non-distended, +BS   NEUROLOGIC:  MAEx4, 5/5 strength throughout; fine touch sensation intact throughout; normal gait; finger-to-nose testing normal; GCS 15, normal heel-to-shin, cranial nerves II through XII intact, no dysarthria, no aphasia, no facial droop, no truncal ataxia, negative test of skew, no pronator drift   MUSCULOSKELETAL: No clubbing, cyanosis or edema   SKIN: No rash, pallor or wounds on exposed surfaces     INITIAL NIH STROKE SCALE    Time Performed:  9009    Administer stroke scale items in the order listed. Record performance in each category after each subscale exam. Do not go back and change scores. Follow directions provided for each exam technique.  Scores should reflect what the patient does, not what the clinician thinks the patient can do. The clinician should record answers while administering the exam and work quickly. Except where indicated, the patient should not be coached (i.e., repeated requests to patient to make a special effort). 1a.  Level of consciousness:  0 - alert; keenly responsive   1b. Level of consciousness questions:  0 - answers both questions correctly  1c. Level of consciousness questions:  0 - performs both tasks correctly  2. Best Gaze:  0 - normal  3. Visual:  0 - no visual loss  4. Facial Palsy:  0 - normal symmetric movement  5a. Motor left arm:  0 - no drift, limb holds 90 (or 45) degrees for full 10 seconds  5b. Motor right arm:  0 - no drift, limb holds 90 (or 45) degrees for full 10 seconds  6a. Motor left le - no drift; leg holds 30 degree position for full 5 seconds  6b. Motor right le - no drift; leg holds 30 degree position for full 5 seconds  7. Limb Ataxia:  0 - absent  8. Sensory:  0 - normal; no sensory loss  9. Best Language:  0 - no aphasia, normal  10. Dysarthria:  0 - normal  11. Extinction and Inattention:  0 - no abnormality    TOTAL:  0        RADIOLOGY  X-RAYS:  I have reviewed radiologic plain film image(s). ALL OTHER NON-PLAIN FILM IMAGES SUCH AS CT, ULTRASOUND AND MRI HAVE BEEN READ BY THE RADIOLOGIST. XR CHEST PORTABLE   Final Result   No acute process. COPD                EKG INTERPRETATION  Normal sinus rhythm with a rate of 78, normal axis, , QRS 70, QTc 453, no ST elevations, nonspecific ST changes, no acute change compared EKG from 10/29/2019    PROCEDURES    ED COURSE/MDM  URI, COVID-19, UTI, dehydration, electrode abnormality, arrhythmia, ACS/MI, pneumonia, COPD  Patient seen and evaluated. History and physical as above. Nontoxic, afebrile.   Patient presents complaining of lightheadedness intermittently since this morning at 7 AM.  Patient with NIH of 0 on arrival.  No focal deficits. Negative test of skew. Does have history of COPD is on 2 L nasal cannula at baseline and tolerating 2 L nasal cannula currently. Does have some rales in the right lung base possibly elated to her COPD. Will obtain routine labs urinalysis, VBG, troponin, BNP, Covid test and chest x-ray. ED Course as of 12/16/21 1955   Thu Dec 16, 2021   1814 Patient's pH on her VBG 7.252 with CO2 of 61 and bicarb of 26. Covid test negative. . Troponin negative. WBC 7.8, hemoglobin 11.0 and hematocrit 33.3. Creatinine 1.1 with BUN of 17. Metabolic panel with sodium 139, potassium 4.3 and CO2 27. Anion gap normal 12. Glucose 111. Urinalysis with rare bacteria, 21 epithelial cells and 14 WBCs which are likely from contamination. No complaints of urinary tract infection. With patient's abnormal pH is 7.25 and CO2 of 60.1, plan for admission for aerosols and monitoring patient's respiratory status. Patient updated on care plan. [DS]      ED Course User Index  [DS] Irma Pendleton MD       Patient was given scripts for the following medications. I counseled patient how to take these medications. New Prescriptions    No medications on file     CRITICAL CARE TIME   Total Critical Care time was 31 minutes, excluding separately reportable procedures. There was a high probability of clinically significant/life threatening deterioration in the patient's condition which required my urgent intervention. CLINICAL IMPRESSION  1. Acute respiratory acidosis    2. Lightheadedness        Blood pressure 124/67, pulse 79, temperature 98.2 °F (36.8 °C), temperature source Temporal, resp. rate 22, SpO2 96 %. DISPOSITION  Admitted    Disclaimer: All medical record entries made by [employer] dictation.       (Please note that this note was completed with a voice recognition program. Every attempt was made to edit the dictations, but inevitably there remain words that are mis-transcribed.)            Irma Pendleton MD  12/16/21 Marilee Jhaveri MD  12/16/21 5616

## 2021-12-16 NOTE — ED NOTES
Pt given a warm blanket per request. Denies any other needs at this time.       Adrian Schultz, MEENA  12/16/21 0795

## 2021-12-17 LAB
ANION GAP SERPL CALCULATED.3IONS-SCNC: 11 MMOL/L (ref 3–16)
BACTERIA: ABNORMAL /HPF
BACTERIA: ABNORMAL /HPF
BILIRUBIN URINE: NEGATIVE
BILIRUBIN URINE: NEGATIVE
BLOOD, URINE: ABNORMAL
BLOOD, URINE: ABNORMAL
BUN BLDV-MCNC: 12 MG/DL (ref 7–20)
CALCIUM SERPL-MCNC: 8.7 MG/DL (ref 8.3–10.6)
CHLORIDE BLD-SCNC: 102 MMOL/L (ref 99–110)
CLARITY: ABNORMAL
CLARITY: ABNORMAL
CO2: 25 MMOL/L (ref 21–32)
COLOR: YELLOW
COLOR: YELLOW
COMMENT UA: ABNORMAL
CREAT SERPL-MCNC: 0.9 MG/DL (ref 0.6–1.2)
EKG ATRIAL RATE: 78 BPM
EKG DIAGNOSIS: NORMAL
EKG P AXIS: 90 DEGREES
EKG P-R INTERVAL: 144 MS
EKG Q-T INTERVAL: 398 MS
EKG QRS DURATION: 70 MS
EKG QTC CALCULATION (BAZETT): 453 MS
EKG R AXIS: 89 DEGREES
EKG T AXIS: 65 DEGREES
EKG VENTRICULAR RATE: 78 BPM
EPITHELIAL CELLS, UA: 24 /HPF (ref 0–5)
EPITHELIAL CELLS, UA: 5 /HPF (ref 0–5)
GFR AFRICAN AMERICAN: >60
GFR NON-AFRICAN AMERICAN: >60
GLUCOSE BLD-MCNC: 116 MG/DL (ref 70–99)
GLUCOSE URINE: 250 MG/DL
GLUCOSE URINE: NEGATIVE MG/DL
HCT VFR BLD CALC: 32.2 % (ref 36–48)
HEMOGLOBIN: 10.5 G/DL (ref 12–16)
HYALINE CASTS: 5 /LPF (ref 0–8)
HYALINE CASTS: 9 /LPF (ref 0–8)
KETONES, URINE: NEGATIVE MG/DL
KETONES, URINE: NEGATIVE MG/DL
LEUKOCYTE ESTERASE, URINE: ABNORMAL
LEUKOCYTE ESTERASE, URINE: NEGATIVE
MCH RBC QN AUTO: 31.6 PG (ref 26–34)
MCHC RBC AUTO-ENTMCNC: 32.6 G/DL (ref 31–36)
MCV RBC AUTO: 97.1 FL (ref 80–100)
MICROSCOPIC EXAMINATION: YES
MICROSCOPIC EXAMINATION: YES
NITRITE, URINE: NEGATIVE
NITRITE, URINE: NEGATIVE
PDW BLD-RTO: 12.8 % (ref 12.4–15.4)
PH UA: 5 (ref 5–8)
PH UA: 5.5 (ref 5–8)
PLATELET # BLD: 179 K/UL (ref 135–450)
PMV BLD AUTO: 8.2 FL (ref 5–10.5)
POTASSIUM REFLEX MAGNESIUM: 4.3 MMOL/L (ref 3.5–5.1)
PROTEIN UA: ABNORMAL MG/DL
PROTEIN UA: NEGATIVE MG/DL
RBC # BLD: 3.31 M/UL (ref 4–5.2)
RBC UA: 1 /HPF (ref 0–4)
RBC UA: 1 /HPF (ref 0–4)
SODIUM BLD-SCNC: 138 MMOL/L (ref 136–145)
SPECIFIC GRAVITY UA: 1.01 (ref 1–1.03)
SPECIFIC GRAVITY UA: 1.01 (ref 1–1.03)
URINE CULTURE, ROUTINE: NORMAL
URINE TYPE: ABNORMAL
URINE TYPE: ABNORMAL
UROBILINOGEN, URINE: 0.2 E.U./DL
UROBILINOGEN, URINE: 0.2 E.U./DL
WBC # BLD: 7.8 K/UL (ref 4–11)
WBC UA: 39 /HPF (ref 0–5)
WBC UA: 5 /HPF (ref 0–5)

## 2021-12-17 PROCEDURE — 2700000000 HC OXYGEN THERAPY PER DAY

## 2021-12-17 PROCEDURE — 94640 AIRWAY INHALATION TREATMENT: CPT

## 2021-12-17 PROCEDURE — G0378 HOSPITAL OBSERVATION PER HR: HCPCS

## 2021-12-17 PROCEDURE — 36415 COLL VENOUS BLD VENIPUNCTURE: CPT

## 2021-12-17 PROCEDURE — 94761 N-INVAS EAR/PLS OXIMETRY MLT: CPT

## 2021-12-17 PROCEDURE — 80048 BASIC METABOLIC PNL TOTAL CA: CPT

## 2021-12-17 PROCEDURE — 96372 THER/PROPH/DIAG INJ SC/IM: CPT

## 2021-12-17 PROCEDURE — 6360000002 HC RX W HCPCS: Performed by: INTERNAL MEDICINE

## 2021-12-17 PROCEDURE — 93010 ELECTROCARDIOGRAM REPORT: CPT | Performed by: INTERNAL MEDICINE

## 2021-12-17 PROCEDURE — 97162 PT EVAL MOD COMPLEX 30 MIN: CPT

## 2021-12-17 PROCEDURE — 6370000000 HC RX 637 (ALT 250 FOR IP): Performed by: INTERNAL MEDICINE

## 2021-12-17 PROCEDURE — 2580000003 HC RX 258: Performed by: INTERNAL MEDICINE

## 2021-12-17 PROCEDURE — 81001 URINALYSIS AUTO W/SCOPE: CPT

## 2021-12-17 PROCEDURE — 97166 OT EVAL MOD COMPLEX 45 MIN: CPT

## 2021-12-17 PROCEDURE — 85027 COMPLETE CBC AUTOMATED: CPT

## 2021-12-17 RX ORDER — IPRATROPIUM BROMIDE AND ALBUTEROL SULFATE 2.5; .5 MG/3ML; MG/3ML
1 SOLUTION RESPIRATORY (INHALATION) 4 TIMES DAILY
Status: DISCONTINUED | OUTPATIENT
Start: 2021-12-17 | End: 2021-12-18 | Stop reason: HOSPADM

## 2021-12-17 RX ADMIN — ENOXAPARIN SODIUM 30 MG: 100 INJECTION SUBCUTANEOUS at 08:08

## 2021-12-17 RX ADMIN — ASPIRIN 81 MG: 81 TABLET, CHEWABLE ORAL at 08:08

## 2021-12-17 RX ADMIN — GLYCOPYRROLATE AND FORMOTEROL FUMARATE 2 PUFF: 9; 4.8 AEROSOL, METERED RESPIRATORY (INHALATION) at 21:09

## 2021-12-17 RX ADMIN — Medication 10 ML: at 21:09

## 2021-12-17 RX ADMIN — IPRATROPIUM BROMIDE 0.5 MG: 0.5 SOLUTION RESPIRATORY (INHALATION) at 08:31

## 2021-12-17 RX ADMIN — METOPROLOL SUCCINATE 25 MG: 25 TABLET, EXTENDED RELEASE ORAL at 08:08

## 2021-12-17 RX ADMIN — IPRATROPIUM BROMIDE AND ALBUTEROL SULFATE 1 AMPULE: .5; 2.5 SOLUTION RESPIRATORY (INHALATION) at 21:09

## 2021-12-17 RX ADMIN — PANTOPRAZOLE SODIUM 40 MG: 40 TABLET, DELAYED RELEASE ORAL at 05:32

## 2021-12-17 RX ADMIN — GLYCOPYRROLATE AND FORMOTEROL FUMARATE 2 PUFF: 9; 4.8 AEROSOL, METERED RESPIRATORY (INHALATION) at 08:31

## 2021-12-17 RX ADMIN — ALBUTEROL SULFATE 5 MG: 2.5 SOLUTION RESPIRATORY (INHALATION) at 08:31

## 2021-12-17 RX ADMIN — IPRATROPIUM BROMIDE AND ALBUTEROL SULFATE 1 AMPULE: .5; 2.5 SOLUTION RESPIRATORY (INHALATION) at 11:55

## 2021-12-17 RX ADMIN — AMLODIPINE BESYLATE 5 MG: 5 TABLET ORAL at 08:08

## 2021-12-17 RX ADMIN — IPRATROPIUM BROMIDE AND ALBUTEROL SULFATE 1 AMPULE: .5; 2.5 SOLUTION RESPIRATORY (INHALATION) at 17:29

## 2021-12-17 RX ADMIN — Medication 10 ML: at 08:08

## 2021-12-17 RX ADMIN — ATORVASTATIN CALCIUM 40 MG: 40 TABLET, FILM COATED ORAL at 08:08

## 2021-12-17 RX ADMIN — PREDNISONE 40 MG: 20 TABLET ORAL at 08:08

## 2021-12-17 ASSESSMENT — PAIN SCALES - GENERAL
PAINLEVEL_OUTOF10: 0

## 2021-12-17 NOTE — PROGRESS NOTES
Medication Reconciliation     List of medications patient is currently taking is complete. Source of information:   1. Conversation with patient at bedside  2. EPIC records        Notes regarding home medications:  1. Patient received her Norvasc, ASA 81mg, and Prilosec today (12/16) PTA. 2. Patient reports using prescription vaginal cream twice a week for UTI's - unable to tell me the name of the cream.    Denies any other OTC/herbal medications.      Mynor Zamarripa, Pharmacy Intern

## 2021-12-17 NOTE — H&P
Hospital Medicine History & Physical      PCP: Brett De La Cruz    Date of Admission: 12/16/2021    Chief Complaint:  SOB    History Of Present Illness:  Patient is a 33-year-old female with past medical history of COPD on 2 L home oxygen who presented to hospital after feeling dizzy. According to patient she does not think her shortness of breath is worsened more than normal however she feels dizzy while standing up or walking so she decided to come to the hospital otherwise denied shortness of breath chest pain nausea vomiting diarrhea constipation dysuria. Patient wears 2 L nasal cannula at home for chronic COPD from smoking. Patient stopped smoking around 15 years ago. Past Medical History:          Diagnosis Date    [facility]     [facility] 12/29/2019    GERD (gastroesophageal reflux disease)     Hyperlipidemia     Hypertension        Past Surgical History:          Procedure Laterality Date    SHOULDER SURGERY         Medications Prior to Admission:      Prior to Admission medications    Medication Sig Start Date End Date Taking?  Authorizing Provider   omeprazole (PRILOSEC) 40 MG delayed release capsule Take 1 capsule by mouth daily 10/20/21  Yes Historical Provider, MD   OXYGEN Inhale 2 L into the lungs    Yes Historical Provider, MD   albuterol (PROVENTIL) (5 MG/ML) 0.5% nebulizer solution Take 1 mL by nebulization 4 times daily as needed for Wheezing 1/1/20  Yes Juan Jose Whatley MD   ipratropium (ATROVENT) 0.02 % nebulizer solution Take 2.5 mLs by nebulization 4 times daily as needed for Wheezing Dispense 120 vials, with 3 refills 1/1/20  Yes Juan Jose Whatley MD   metoprolol succinate (TOPROL XL) 25 MG extended release tablet Take 25 mg by mouth daily   Yes Historical Provider, MD   amLODIPine (NORVASC) 5 MG tablet Take 5 mg by mouth daily   Yes Historical Provider, MD   atorvastatin (LIPITOR) 40 MG tablet Take 40 mg by mouth daily   Yes Historical Provider, MD   aspirin 81 MG tablet Take 81 mg by mouth daily   Yes Historical Provider, MD   umeclidinium-vilanterol (ANORO ELLIPTA) 62.5-25 MCG/INH AEPB inhaler Inhale 1 puff into the lungs daily   Yes Historical Provider, MD   ALBUTEROL SULFATE HFA IN Inhale 2 puffs into the lungs   Yes Historical Provider, MD       Allergies:  Sulfamethoxazole-trimethoprim    Social History:      TOBACCO:   reports that she quit smoking about 4 years ago. Her smoking use included cigarettes. She quit after 50.00 years of use. She has never used smokeless tobacco.  ETOH:   reports current alcohol use of about 1.0 standard drink of alcohol per week. Family History:       Reviewed in detail and non contributory      History reviewed. No pertinent family history. REVIEW OF SYSTEMS:   Pertinent positives as noted in the HPI. All other systems reviewed and negative. PHYSICAL EXAM PERFORMED:    /67   Pulse 79   Temp 98.2 °F (36.8 °C) (Temporal)   Resp 22   SpO2 96%     General appearance:  No apparent distress, cooperative. HEENT:  Normal cephalic, atraumatic without obvious deformity. Conjunctivae/corneas clear. Neck: Supple, with full range of motion. No cervical lymphadenopathy  Respiratory: Decreased breathing sounds bilaterally, bilaterally without Rales/Wheezes/Rhonchi. Cardiovascular:  Regular rate and rhythm with normal S1/S2 without murmurs, rubs or gallops. Abdomen: Soft, non-tender, non-distended, normal bowel sounds. Musculoskeletal:  No edema noted bilaterally. No tenderness on palpation   Skin: no rash visible  Neurologic:  Neurologically intact without any focal sensory/motor deficits.   grossly non-focal.  Psychiatric:  Alert and oriented, normal mood  Peripheral Pulses: +2 palpable, equal bilaterally       Labs:     Recent Labs     12/16/21  1555   WBC 7.8   HGB 11.0*   HCT 33.3*        Recent Labs     12/16/21  1555      K 4.3      CO2 27   BUN 17 CREATININE 1.0   CALCIUM 9.3     Recent Labs     12/16/21  1555   AST 17   ALT 11   BILITOT 0.3   ALKPHOS 81     No results for input(s): INR in the last 72 hours. Recent Labs     12/16/21  1555   TROPONINI <0.01       Urinalysis:      Lab Results   Component Value Date    NITRU Negative 12/16/2021    WBCUA 14 12/16/2021    BACTERIA RARE 12/16/2021    RBCUA 2 12/16/2021    BLOODU TRACE 12/16/2021    SPECGRAV 1.007 12/16/2021    GLUCOSEU Negative 12/16/2021    GLUCOSEU NEGATIVE 08/14/2011       Radiology:       XR CHEST PORTABLE   Final Result   No acute process. COPD                 Active Hospital Problems    Diagnosis Date Noted    Dizziness [R42] 12/16/2021       Patient is a 80-year-old female with past medical history of COPD on 2 L home oxygen who presented to hospital after feeling dizzy. According to patient she does not think her shortness of breath is worsened more than normal however she feels dizzy while standing up or walking so she decided to come to the hospital otherwise denied shortness of breath chest pain nausea vomiting diarrhea constipation dysuria. Patient wears 2 L nasal cannula at home for chronic COPD from smoking. Patient stopped smoking around 15 years ago. Assessment  Respiratory acidosis on VBG, unclear if it is chronic  COPD on 2 L nasal cannula  Dizziness, rule out orthostatic hypotension    Plan  Check orthostatic vitals, monitor on cardiac telemetry, will start p.o. prednisone, resume home inhalations  DVT prophylaxis Lovenox  Resume home medications  Diet: No diet orders on file  Code Status: Prior    PT/OT Eval Status: ordered    Dispo - pending clinical improvement       Richi Sellers MD    The note was completed using EMR and Dragon dictation system. Every effort was made to ensure accuracy; however, inadvertent computerized transcription errors may be present. Thank you Abdon Dhillon for the opportunity to be involved in this patient's care.  If you have any questions or concerns please feel free to contact me at [phone].     Carole Bahena MD

## 2021-12-17 NOTE — PROGRESS NOTES
Patient admitted to room 0475 18 [phone] 64 @ [phone]. A&O x4, oriented to surroundings. Skin warm and dry, natural in color. Resp diminished, dyspnea with exertion, on 3L O2 per N/C. Ambulated to bathroom, steady gait, tolerated well. In bed, call light in reach.

## 2021-12-17 NOTE — PROGRESS NOTES
Physical Therapy    Facility/Department: [facility] PROGRESSIVE CARE  Initial Assessment and d/c summary    NAME: Lizbeth Espino  : 1940  MRN: [id number]    Date of Service: 2021    Discharge Recommendations:  Home with assist PRN   PT Equipment Recommendations  Equipment Needed: No     Lizbeth Espino scored a 24/24 on the AM-PAC short mobility form. At this time, no further PT is recommended upon discharge due to being indep with mobility. Recommend patient returns to prior setting with prior services. Assessment   Body structures, Functions, Activity limitations: Decreased endurance  Assessment: Pt is a 79 y/o female who presented to the ED with dizziness. Pt lives in a one level home with 1 NEFTALI with her daughter and is indep ambulating without an AD PTA but limited by endurance due to COPD. Today, pt mod I for bed mobility and transfers and indep ambulating short distance without an AD and managing her own 02 line. No acute care PT needs. Anticipate that pt will be safe to d/c home with assist PRN from her daughter. Prognosis: Good  Decision Making: Medium Complexity  History: Lopez Carlton MD \"Patient is a 72-year-old female with past medical history of COPD on 2 L home oxygen who presented to hospital after feeling dizzy. According to patient she does not think her shortness of breath is worsened more than normal however she feels dizzy while standing up or walking so she decided to come to the hospital otherwise denied shortness of breath chest pain nausea vomiting diarrhea constipation dysuria. Patient wears 2 L nasal cannula at home for chronic COPD from smoking. Patient stopped smoking around 15 years ago. \"  Exam: functional mobility, ROM, strength  Clinical Presentation: evolving  PT Education: PT Role  Barriers to Learning: none  No Skilled PT: Independent with functional mobility   REQUIRES PT FOLLOW UP: No  Activity Tolerance  Activity Tolerance: Patient Tolerated treatment well; Patient limited by endurance       Patient Diagnosis(es): The primary encounter diagnosis was Acute respiratory acidosis. A diagnosis of Lightheadedness was also pertinent to this visit. has a past medical history of COPD (chronic obstructive pulmonary disease) ([address] COPD exacerbation ([address] GERD (gastroesophageal reflux disease), Hyperlipidemia, and Hypertension. has a past surgical history that includes shoulder surgery. Restrictions  Restrictions/Precautions  Restrictions/Precautions: Up Ad Radha  Position Activity Restriction  Other position/activity restrictions: 2L 02     Vision/Hearing  Vision: Impaired  Vision Exceptions: Wears glasses at all times  Hearing: Exceptions to [facility]  Hearing Exceptions: Hard of hearing/hearing concerns       Subjective  General  Chart Reviewed: Yes  Patient assessed for rehabilitation services?: Yes  Additional Pertinent Hx: Lopez Carlton MD \"Patient is a 68-year-old female with past medical history of COPD on 2 L home oxygen who presented to hospital after feeling dizzy. According to patient she does not think her shortness of breath is worsened more than normal however she feels dizzy while standing up or walking so she decided to come to the hospital otherwise denied shortness of breath chest pain nausea vomiting diarrhea constipation dysuria. Patient wears 2 L nasal cannula at home for chronic COPD from smoking. Patient stopped smoking around 15 years ago. \"  Response To Previous Treatment: Not applicable  Family / Caregiver Present: No  Referring Practitioner: Makayla Armendariz MD  Referral Date : 12/17/21  Diagnosis: dizziness  Follows Commands: Within Functional Limits  Subjective  Subjective: Pt pleasant and agreeable to PT eval and treat. No c/o pain and denies dizziness.   Pain Screening  Patient Currently in Pain: Denies          Orientation  Orientation  Overall Orientation Status: Within Functional Limits     Social/Functional History  Social/Functional History  Lives With: Daughter  Type of Home: House  Home Layout: One level, Able to Live on Main level with bedroom/bathroom  Home Access: Stairs to enter with rails  Entrance Stairs - Number of Steps: 1  Entrance Stairs - Rails: Right  Bathroom Shower/Tub: Tub/Shower unit, Curtain (pt takes a bath)  Bathroom Toilet: Standard (vanity on L)  Bathroom Equipment: Hand-held shower  Bathroom Accessibility: Not accessible  Home Equipment: Oxygen (2L)  ADL Assistance: [address]: Needs assistance (pt cooks, daughter does cleaning, laundry and grocery shopping)  Ambulation Assistance: Independent  Transfer Assistance: Independent  Active : No  Patient's  Info: daughter  Occupation: Retired  Type of occupation: owned a business  Leisure & Hobbies: reads, plays games on the computer, talks a lot on the phone, goes out to lunch  Additional Comments: denies recent hx of falls       Objective          AROM RLE (degrees)  RLE AROM: WFL  AROM LLE (degrees)  LLE AROM : WFL  Strength RLE  Comment: 4/5 knee ext and ankle DF  Strength LLE  Comment: 4/5 knee ext and ankle DF  Tone RLE  RLE Tone: Normotonic  Tone LLE  LLE Tone: Normotonic  Motor Control  Gross Motor?: WFL  Sensation  Overall Sensation Status: WFL     Bed mobility  Supine to Sit: Modified independent (HOB elevated)  Sit to Supine: Unable to assess (up in chair at end of session)     Transfers  Sit to Stand: Modified independent  Stand to sit: Modified independent     Ambulation  Ambulation?: Yes  More Ambulation?: No  Ambulation 1  Surface: level tile  Device: No Device  Other Apparatus: O2 (pt managed her own line)  Assistance: Independent  Quality of Gait: steady without AD, mild [place]ERN[place]  Distance: 15'  Stairs/Curb  Stairs?: No     Balance  Posture: Fair  Sitting - Static: Good  Sitting - Dynamic: Good  Standing - Static: Good  Standing - Dynamic: Good        Plan   Safety Devices  Type of devices: Call light within reach, Gait belt, Left in chair, Nurse notified (MEENA Millard aware of PT eval and treat)  Restraints  Initially in place: No    AM-PAC Score  AM-PAC Inpatient Mobility Raw Score : 24 (12/17/21 1021)  AM-PAC Inpatient T-Scale Score : 61.14 (12/17/21 1021)  Mobility Inpatient CMS 0-100% Score: 0 (12/17/21 1021)  Mobility Inpatient CMS G-Code Modifier : [address] (12/17/21 1021)        Therapy Time   Individual Concurrent Group Co-treatment   Time In 1003         Time Out 1015         Minutes 12         Timed Code Treatment Minutes: 0 Minutes         Electronically signed by Namita Grover on 12/17/2021 at 10:25 AM

## 2021-12-17 NOTE — PROGRESS NOTES
Occupational Therapy   Occupational Therapy Initial Assessment  Date: 2021   Patient Name: Catherine Hernandez  MRN: [id number]     : 1940    Date of Service: 2021    Discharge Recommendations:  Home with assist PRN     Catherine Hernandez scored a 24/24 on the AM-PAC ADL Inpatient form. At this time, no further OT is recommended upon discharge. Recommend patient returns to prior setting with prior services. Assessment   Assessment: Pt is a 81 yo female admitted secondary to dizziness and acute respiratory acidosis. PTA, pt lives in house with daughter where she is independent with ADLs and fxl mob/transfers w/o device. Pt does cook but daughter does cleaning, laundry and grocery shopping. TODAY- pt performed bed mobility and fxl mob/transfers with no device mod I. Pt able to manage O2 line. Pt performed LB dressing seated EOB with mod I. Anticipate pt to be safe to return home with assist PRN based on activity tolerance, balance and strength observed today. Pt appears to be at/near baseline function and requires no further skilled OT during this hospitalization. Anticipate pt to be safe to return home with assist PRN and no home OT recommendations at d/c. Prognosis: Good  Decision Making: Medium Complexity  Exam: BUE ROM/strength, ADL/IADL status, fxl mob/transfers, cognition, balance, activity tolerance  OT Education: OT Role; Plan of Care; Energy Conservation; Transfer Training  No Skilled OT: At baseline function; Safe to return home  REQUIRES OT FOLLOW UP: No  Activity Tolerance  Activity Tolerance: Patient Tolerated treatment well  Safety Devices  Safety Devices in place: Yes  Type of devices: Call light within reach; Left in chair; Gait belt; Nurse notified           Patient Diagnosis(es): The primary encounter diagnosis was Acute respiratory acidosis. A diagnosis of Lightheadedness was also pertinent to this visit.      has a past medical history of COPD (chronic obstructive pulmonary disease) ([facility] [address] COPD exacerbation ([facility] [address] GERD (gastroesophageal reflux disease), Hyperlipidemia, and Hypertension. has a past surgical history that includes shoulder surgery. Restrictions  Restrictions/Precautions  Restrictions/Precautions: Up Ad Radha  Position Activity Restriction  Other position/activity restrictions: 2L 02    Subjective   General  Chart Reviewed: Yes, History and Physical, Progress Notes, Orders  Patient assessed for rehabilitation services?: Yes  Additional Pertinent Hx: Per H&P from 12/16: \"Patient is a 44-year-old female with past medical history of COPD on 2 L home oxygen who presented to hospital after feeling dizzy. According to patient she does not think her shortness of breath is worsened more than normal however she feels dizzy while standing up or walking so she decided to come to the hospital otherwise denied shortness of breath chest pain nausea vomiting diarrhea constipation dysuria. Patient wears 2 L nasal cannula at home for chronic COPD from smoking. Patient stopped smoking around 15 years ago. \"  Referring Practitioner: Rosa Donovan MD  Subjective  Subjective: Pt met b/s for OT eval/tx with PT. Pt agreeable to therapy.  Pt denies pain  General Comment  Comments: Per RN ok to see    Social/Functional History  Social/Functional History  Lives With: Daughter  Type of Home: House  Home Layout: One level, Able to Live on Main level with bedroom/bathroom  Home Access: Stairs to enter with rails  Entrance Stairs - Number of Steps: 1  Entrance Stairs - Rails: Right  Bathroom Shower/Tub: Tub/Shower unit, Curtain (pt takes a bath)  Bathroom Toilet: Standard (vanity on L)  Bathroom Equipment: Hand-held shower  Bathroom Accessibility: Not accessible  Home Equipment: Oxygen (2L)  ADL Assistance: Independent  Homemaking Assistance: Needs assistance (pt cooks, daughter does cleaning, laundry and grocery shopping)  Ambulation Assistance: Independent  Transfer Assistance: Independent  Active : No  Patient's  Info: daughter  Occupation: Retired  Type of occupation: owned a business  Leisure & Hobbies: reads, plays games on the computer, talks a lot on the phone, goes out to lunch  Additional Comments: denies recent hx of falls       Objective   Vision: Impaired  Vision Exceptions: Wears glasses at all times  Hearing: Exceptions to [facility]  Hearing Exceptions: Hard of hearing/hearing concerns          Balance  Sitting Balance: Independent  Standing Balance: Independent  Functional Mobility  Functional - Mobility Device: No device  Activity: Other (within room)  Assist Level:  Independent  Functional Mobility Comments: Pt managed O2 line, no device, no LOB  ADL  LE Dressing: Modified independent  (Pt donned socks)  Toileting:  (Pt declined toileting needs but reports performing independently)  Additional Comments: Anticipate pt to be safe to return home with assist PRN based on activity tolerance, balance and strength observed today        Bed mobility  Supine to Sit: Modified independent (HOB elevated)  Sit to Supine: Unable to assess (up in chair at end of session)  Transfers  Sit to stand: Modified independent  Stand to sit: Modified independent  Transfer Comments: no device     Cognition  Overall Cognitive Status: WFL  Perception  Overall Perceptual Status: WFL     Sensation  Overall Sensation Status: WFL        LUE AROM (degrees)  LUE AROM : WFL  Left Hand AROM (degrees)  Left Hand AROM: WFL  RUE AROM (degrees)  RUE AROM : WFL  Right Hand AROM (degrees)  Right Hand AROM: WFL  LUE Strength  Gross LUE Strength: WFL  RUE Strength  Gross RUE Strength: WFL       Plan   Plan  Times per week: No further acute care OT     AM-PAC Score        AM-PAC Inpatient Daily Activity Raw Score: 24 (12/17/21 1025)  AM-PAC Inpatient ADL T-Scale Score : 57.54 (12/17/21 1025)  ADL Inpatient CMS 0-100% Score: 0 (12/17/21 1025)  ADL Inpatient CMS G-Code Modifier : CH (12/17/21 1025)    Goals  Short term goals  Time Frame for Short term goals: No further acute care OT  Patient Goals   Patient goals :  To return home       Therapy Time   Individual Concurrent Group Co-treatment   Time In 1003         Time Out 1015         Minutes 12         Timed Code Treatment Minutes: 1527 RANDA Senior   Electronically signed by Radha Schuster OTR/ENMA  License # [id number]

## 2021-12-17 NOTE — RT PROTOCOL NOTE
RT Nebulizer Bronchodilator Protocol Note    There is a bronchodilator order in the chart from a provider indicating to follow the RT Bronchodilator Protocol and there is an Initiate RT Bronchodilator Protocol order as well (see protocol at bottom of note). CXR Findings:  XR CHEST PORTABLE    Result Date: 12/16/2021  No acute process. COPD       The findings from the last RT Protocol Assessment were as follows:  Smoking: Chronic pulmonary disease  Respiratory Pattern: Dyspnea on exertion or RR 21-25 bpm  Breath Sounds: Inspiratory and expiratory or bilateral wheezing and/or rhonchi  Cough: Weak, non-productive  Indication for Bronchodilator Therapy: On home bronchodilators  Bronchodilator Assessment Score: 13    Aerosolized bronchodilator medication orders have been revised according to the RT Nebulizer Bronchodilator Protocol below. Respiratory Therapist to perform RT Therapy Protocol Assessment initially then follow the protocol. Repeat RT Therapy Protocol Assessment PRN for score 0-3 or on second treatment, BID, and PRN for scores above 3. No Indications - adjust the frequency to every 6 hours PRN wheezing or bronchospasm, if no treatments needed after 48 hours then discontinue using Per Protocol order mode. If indication present, adjust the RT bronchodilator orders based on the Bronchodilator Assessment Score as indicated below. If a patient is on this medication at home then do not decrease Frequency below that used at home. 0-3 - enter or revise RT bronchodilator order(s) to equivalent RT Bronchodilator order with Frequency of every 4 hours PRN for wheezing or increased work of breathing using Per Protocol order mode. 4-6 - enter or revise RT Bronchodilator order(s) to two equivalent RT bronchodilator orders with one order with BID Frequency and one order with Frequency of every 4 hours PRN wheezing or increased work of breathing using Per Protocol order mode.          7-10 - enter or revise RT Bronchodilator order(s) to two equivalent RT bronchodilator orders with one order with TID Frequency and one order with Frequency of every 4 hours PRN wheezing or increased work of breathing using Per Protocol order mode. 11-13 - enter or revise RT Bronchodilator order(s) to one equivalent RT bronchodilator order with QID Frequency and an Albuterol order with Frequency of every 4 hours PRN wheezing or increased work of breathing using Per Protocol order mode. Greater than 13 - enter or revise RT Bronchodilator order(s) to one equivalent RT bronchodilator order with every 4 hours Frequency and an Albuterol order with Frequency of every 2 hours PRN wheezing or increased work of breathing using Per Protocol order mode. RT to enter RT Home Evaluation for COPD & MDI Assessment order using Per Protocol order mode.     Electronically signed by Roberta Rivero RCP on 12/16/2021 at 9:56 PM

## 2021-12-17 NOTE — CARE COORDINATION
INITIAL CASE MANAGEMENT ASSESSMENT     Reviewed chart, spoke with patient to assess possible discharge needs. Explained Case Management role/services. Living Situation: verified home address. Patient lives in a house with daughter, Jonn Godoy. Patient reports no issues or concerns with mobility in the home. ADLs: Independent      DME: O2 with [employer]    PT/OT Recs: Home with assist PRN     Active Services: None      Transportation: family      Medications: Need to verify     PCP: Santi Red      HD/PD: n/a    PLAN/COMMENTS: Return home with daughter at discharge. ACP Completed with patient. Patient has POA documents. SW/CM provided contact information for patient or family to call with any questions. SW/CM will follow and assist as needed.   DAVID Carl, ROLANDO, Social Work/Case Management   [phone]  Electronically signed by DAVID Carl LSW on 12/17/2021 at 4:23 PM

## 2021-12-17 NOTE — PROGRESS NOTES
4 Eyes Skin Assessment     NAME:  Andriy Gonzalez  YOB: 1940  MEDICAL RECORD NUMBER:  [id number]    The patient is being assess for  Admission    I agree that 2 RN's have performed a thorough Head to Toe Skin Assessment on the patient. ALL assessment sites listed below have been assessed. Areas assessed by both nurses:    Head, Face, Ears, Shoulders, Back, Chest, Arms, Elbows, Hands, Sacrum. Buttock, Coccyx, Ischium and Legs. Feet and Heels        Does the Patient have a Wound?  No noted wound(s)       Liam Prevention initiated:  Yes   Wound Care Orders initiated:  No    Pressure Injury (Stage 3,4, Unstageable, DTI, NWPT, and Complex wounds) if present place consult order under [de-identified] No    New and Established Ostomies if present place consult order under : No      Nurse 1 eSignature: Electronically signed by Noemi Muñiz RN on 12/17/21 at 1:30 AM EST    **SHARE this note so that the co-signing nurse is able to place an eSignature**    Nurse 2 eSignature: Electronically signed by Ramiro Valenzuela RN on 12/17/21 at 2:04 AM EST

## 2021-12-17 NOTE — ACP (ADVANCE CARE PLANNING)
Advance Care Planning     Advance Care Planning Activator (Inpatient)  Conversation Note      Date of ACP Conversation: 12/17/2021     Conversation Conducted with: Patient with Decision Making Capacity    ACP Activator: DAVID Hung, [facility] Decision Maker:     Current Designated Health Care Decision Maker:     Primary Decision Maker: Maddi Maloney Child - [phone]    Today we documented Decision Maker(s). The patient will provide ACP documents. Care Preferences    Ventilation: \"If you were in your present state of health and suddenly became very ill and were unable to breathe on your own, what would your preference be about the use of a ventilator (breathing machine) if it were available to you? \"      Would the patient desire the use of ventilator (breathing machine)?: yes    \"If your health worsens and it becomes clear that your chance of recovery is unlikely, what would your preference be about the use of a ventilator (breathing machine) if it were available to you? \"     Would the patient desire the use of ventilator (breathing machine)?: No      Resuscitation  \"CPR works best to restart the heart when there is a sudden event, like a heart attack, in someone who is otherwise healthy. Unfortunately, CPR does not typically restart the heart for people who have serious health conditions or who are very sick. \"    \"In the event your heart stopped as a result of an underlying serious health condition, would you want attempts to be made to restart your heart (answer \"yes\" for attempt to resuscitate) or would you prefer a natural death (answer \"no\" for do not attempt to resuscitate)? \" yes       [] Yes   [x] No   Educated Patient / Chato Ambrose regarding differences between Advance Directives and portable DNR orders.     Length of ACP Conversation in minutes:  5    Conversation Outcomes:  [x] ACP discussion completed  [] Existing advance directive reviewed with patient; no changes to patient's previously recorded wishes  [] New Advance Directive completed  [] Portable Do Not Rescitate prepared for Provider review and signature  [] POLST/POST/MOLST/MOST prepared for Provider review and signature      Follow-up plan:    [] Schedule follow-up conversation to continue planning  [] Referred individual to Provider for additional questions/concerns   [] Advised patient/agent/surrogate to review completed ACP document and update if needed with changes in condition, patient preferences or care setting    [] This note routed to one or more involved healthcare providers     Electronically signed by DAVID Arcos, ROLANDO on 12/17/2021 at 4:22 PM

## 2021-12-17 NOTE — PROGRESS NOTES
Hospitalist Progress Note      PCP: France Yoo    Date of Admission: 12/16/2021        Subjective: Feels okay, her dyspnea at baseline, denies dizziness at this time no palpitation chest pain or blurry vision. Medications:  Reviewed    Infusion Medications    sodium chloride       Scheduled Medications    sodium chloride flush  10 mL IntraVENous 2 times per day    predniSONE  40 mg Oral Daily    albuterol  5 mg Nebulization 4x daily    amLODIPine  5 mg Oral Daily    aspirin  81 mg Oral Daily    atorvastatin  40 mg Oral Daily    ipratropium  0.5 mg Nebulization 4x daily    metoprolol succinate  25 mg Oral Daily    pantoprazole  40 mg Oral QAM AC    glycopyrrolate-formoterol  2 puff Inhalation BID    enoxaparin  30 mg SubCUTAneous Daily     PRN Meds: sodium chloride flush, sodium chloride, potassium chloride **OR** potassium alternative oral replacement **OR** potassium chloride, potassium chloride, magnesium sulfate, promethazine **OR** ondansetron, magnesium hydroxide, acetaminophen **OR** acetaminophen      Intake/Output Summary (Last 24 hours) at 12/17/2021 4559  Last data filed at 12/17/2021 0810  Gross per 24 hour   Intake 390 ml   Output --   Net 390 ml       Physical Exam Performed:    /62   Pulse 96   Temp 98 °F (36.7 °C) (Oral)   Resp 18   Ht 4' 8\" (1.422 m)   Wt 78 lb 14.8 oz (35.8 kg)   SpO2 94%   BMI 17.69 kg/m²     General appearance: No apparent distress  Neck: Supple  Respiratory: Diminished breath sounds  Cardiovascular: Regular rate and rhythm with normal S1/S2 without murmurs, rubs or gallops. Abdomen: Soft, non-tender  Musculoskeletal: No clubbing, cyanosis  Skin: Skin color, texture, turgor normal.  No rashes or lesions.   Neurologic:  No focal weakness   Psychiatric: Alert and oriented  Capillary Refill: Brisk,3 seconds, normal   Peripheral Pulses: +2 palpable, equal bilaterally       Labs:   Recent Labs     12/16/21  1555 12/17/21  0515   WBC 7.8 7.8 HGB 11.0* 10.5*   HCT 33.3* 32.2*    179     Recent Labs     12/16/21  1555 12/17/21  0515    138   K 4.3 4.3    102   CO2 27 25   BUN 17 12   CREATININE 1.0 0.9   CALCIUM 9.3 8.7     Recent Labs     12/16/21  1555   AST 17   ALT 11   BILITOT 0.3   ALKPHOS 81     No results for input(s): INR in the last 72 hours. Recent Labs     12/16/21  1555   TROPONINI <0.01       Urinalysis:      Lab Results   Component Value Date    NITRU Negative 12/16/2021    WBCUA 14 12/16/2021    BACTERIA RARE 12/16/2021    RBCUA 2 12/16/2021    BLOODU TRACE 12/16/2021    SPECGRAV 1.007 12/16/2021    GLUCOSEU Negative 12/16/2021    GLUCOSEU NEGATIVE 08/14/2011       Radiology:  XR CHEST PORTABLE   Final Result   No acute process. COPD                 Assessment/Plan:    Active Hospital Problems    Diagnosis     Dizziness [R42]      1. Dizziness, improved at this time no vertigo. Continue to monitor  2. COPD with possible mild exacerbation, patient already started on p.o. steroids will keep for now  3. Pyuria with multiple epithelial cells, will repeat UA  4. Essential hypertension, continue p.o. medication  5. GERD on pantoprazole      Diet: ADULT DIET;  Regular  Code Status: Full Code        Johnnie Pak MD

## 2021-12-17 NOTE — PLAN OF CARE
Problem: Falls - Risk of:  Goal: Will remain free from falls  Description: Will remain free from falls  12/17/2021 0828 by Nae Johnson RN  Outcome: Ongoing   Fall risk assessment completed every shift. All precautions in place. Pt has call light within reach at all times. Room clear of clutter. Pt aware to call for assistance when getting up. Bed locked in lowest position, alarm engaged, call light within reach, will continue to monitor. Problem: Skin Integrity:  Goal: Will show no infection signs and symptoms  Description: Will show no infection signs and symptoms  12/17/2021 0828 by Nae Johnson RN  Outcome: Ongoing   Skin assessment completed every shift. Pt assessed for incontinence, appropriate barrier cream applied prn. Pt encouraged to turn/rotate every 2 hours. Assistance provided if pt unable to do so themselves.

## 2021-12-18 ENCOUNTER — PATIENT MESSAGE (OUTPATIENT)
Dept: PULMONOLOGY | Age: 81
End: 2021-12-18

## 2021-12-18 VITALS
SYSTOLIC BLOOD PRESSURE: 120 MMHG | HEART RATE: 64 BPM | RESPIRATION RATE: 18 BRPM | HEIGHT: 56 IN | DIASTOLIC BLOOD PRESSURE: 70 MMHG | OXYGEN SATURATION: 96 % | WEIGHT: 79.14 LBS | BODY MASS INDEX: 17.8 KG/M2 | TEMPERATURE: 97.9 F

## 2021-12-18 LAB
ANION GAP SERPL CALCULATED.3IONS-SCNC: 12 MMOL/L (ref 3–16)
BUN BLDV-MCNC: 19 MG/DL (ref 7–20)
CALCIUM SERPL-MCNC: 9 MG/DL (ref 8.3–10.6)
CHLORIDE BLD-SCNC: 101 MMOL/L (ref 99–110)
CO2: 26 MMOL/L (ref 21–32)
CREAT SERPL-MCNC: 1.1 MG/DL (ref 0.6–1.2)
GFR AFRICAN AMERICAN: 58
GFR NON-AFRICAN AMERICAN: 48
GLUCOSE BLD-MCNC: 123 MG/DL (ref 70–99)
HCT VFR BLD CALC: 31.7 % (ref 36–48)
HEMOGLOBIN: 10.6 G/DL (ref 12–16)
MCH RBC QN AUTO: 32.1 PG (ref 26–34)
MCHC RBC AUTO-ENTMCNC: 33.3 G/DL (ref 31–36)
MCV RBC AUTO: 96.3 FL (ref 80–100)
PDW BLD-RTO: 12.6 % (ref 12.4–15.4)
PLATELET # BLD: 188 K/UL (ref 135–450)
PMV BLD AUTO: 7.9 FL (ref 5–10.5)
POTASSIUM REFLEX MAGNESIUM: 4.6 MMOL/L (ref 3.5–5.1)
RBC # BLD: 3.29 M/UL (ref 4–5.2)
SODIUM BLD-SCNC: 139 MMOL/L (ref 136–145)
WBC # BLD: 7.6 K/UL (ref 4–11)

## 2021-12-18 PROCEDURE — 6370000000 HC RX 637 (ALT 250 FOR IP): Performed by: INTERNAL MEDICINE

## 2021-12-18 PROCEDURE — G0378 HOSPITAL OBSERVATION PER HR: HCPCS

## 2021-12-18 PROCEDURE — 2700000000 HC OXYGEN THERAPY PER DAY

## 2021-12-18 PROCEDURE — 6360000002 HC RX W HCPCS: Performed by: INTERNAL MEDICINE

## 2021-12-18 PROCEDURE — 94761 N-INVAS EAR/PLS OXIMETRY MLT: CPT

## 2021-12-18 PROCEDURE — 94640 AIRWAY INHALATION TREATMENT: CPT

## 2021-12-18 PROCEDURE — 85027 COMPLETE CBC AUTOMATED: CPT

## 2021-12-18 PROCEDURE — 36415 COLL VENOUS BLD VENIPUNCTURE: CPT

## 2021-12-18 PROCEDURE — 80048 BASIC METABOLIC PNL TOTAL CA: CPT

## 2021-12-18 PROCEDURE — 96372 THER/PROPH/DIAG INJ SC/IM: CPT

## 2021-12-18 RX ORDER — PREDNISONE 20 MG/1
20 TABLET ORAL DAILY
Qty: 3 TABLET | Refills: 0 | Status: SHIPPED | OUTPATIENT
Start: 2021-12-18 | End: 2021-12-21

## 2021-12-18 RX ORDER — PREDNISONE 20 MG/1
20 TABLET ORAL DAILY
Qty: 3 TABLET | Refills: 0 | Status: SHIPPED | OUTPATIENT
Start: 2021-12-18 | End: 2021-12-18

## 2021-12-18 RX ADMIN — PREDNISONE 40 MG: 20 TABLET ORAL at 08:06

## 2021-12-18 RX ADMIN — ENOXAPARIN SODIUM 30 MG: 100 INJECTION SUBCUTANEOUS at 08:07

## 2021-12-18 RX ADMIN — GLYCOPYRROLATE AND FORMOTEROL FUMARATE 2 PUFF: 9; 4.8 AEROSOL, METERED RESPIRATORY (INHALATION) at 08:07

## 2021-12-18 RX ADMIN — IPRATROPIUM BROMIDE AND ALBUTEROL SULFATE 1 AMPULE: .5; 2.5 SOLUTION RESPIRATORY (INHALATION) at 08:07

## 2021-12-18 RX ADMIN — ATORVASTATIN CALCIUM 40 MG: 40 TABLET, FILM COATED ORAL at 08:06

## 2021-12-18 RX ADMIN — ASPIRIN 81 MG: 81 TABLET, CHEWABLE ORAL at 08:06

## 2021-12-18 RX ADMIN — METOPROLOL SUCCINATE 25 MG: 25 TABLET, EXTENDED RELEASE ORAL at 08:06

## 2021-12-18 RX ADMIN — PANTOPRAZOLE SODIUM 40 MG: 40 TABLET, DELAYED RELEASE ORAL at 07:00

## 2021-12-18 RX ADMIN — IPRATROPIUM BROMIDE AND ALBUTEROL SULFATE 1 AMPULE: .5; 2.5 SOLUTION RESPIRATORY (INHALATION) at 11:47

## 2021-12-18 RX ADMIN — AMLODIPINE BESYLATE 5 MG: 5 TABLET ORAL at 08:06

## 2021-12-18 ASSESSMENT — PAIN SCALES - GENERAL: PAINLEVEL_OUTOF10: 0

## 2021-12-18 NOTE — PROGRESS NOTES
Patient is alert and oriented x4, UAL, call light within reach. AM meds complete, patient tolerated well. VSS and WDL. No s/s of distress, no further needs noted at this time.  Electronically signed by Alex Bear RN on 12/18/2021 at 10:33 AM

## 2021-12-18 NOTE — PROGRESS NOTES
Data- discharge order received, pt verbalized agreement to discharge, disposition home with daughter, no needs for HHC/DME. Action- discharge instructions prepared and given to patient, pt verbalized understanding. Medication information packet given r/t NEW and/or CHANGED prescriptions emphasizing name/purpose/side effects, pt verbalized understanding. Discharge instruction summary: Diet- regular, Activity- as tolerated, Primary Care Physician as followsEvy Pavon [phone] f/u appointment 3 days, immunizations reviewed and verified with patient, prescription medications filled [facility]. Response- Pt belongings gathered, IV removed. Disposition is home (no HHC/DME needs), transported with staff, taken to lobby via w/c w/ staff, no complications.  Electronically signed by Corine Saleem RN on 12/18/2021 at 1:20 PM

## 2021-12-18 NOTE — DISCHARGE SUMMARY
cyanosis   Skin: Skin color, texture, turgor normal.  No rashes or lesions. Neurologic:  NO FOCAL WEAKNESS   Psychiatric:  Alert and oriented  Capillary Refill: Brisk,< 3 seconds   Peripheral Pulses: +2 palpable, equal bilaterally       Labs: For convenience and continuity at follow-up the following most recent labs are provided:      CBC:    Lab Results   Component Value Date    WBC 7.6 12/18/2021    HGB 10.6 12/18/2021    HCT 31.7 12/18/2021     12/18/2021       Renal:    Lab Results   Component Value Date     12/18/2021    K 4.6 12/18/2021     12/18/2021    CO2 26 12/18/2021    BUN 19 12/18/2021    CREATININE 1.1 12/18/2021    CALCIUM 9.0 12/18/2021    PHOS 3.5 12/30/2019         Significant Diagnostic Studies    Radiology:   XR CHEST PORTABLE   Final Result   No acute process.       COPD                Consults:     None    Disposition:  home     Condition at Discharge: Stable    Discharge Instructions/Follow-up:  PCP    Code Status:  Full Code     Activity: activity as tolerated    Diet: regular diet      Discharge Medications:     Current Discharge Medication List           Details   predniSONE (DELTASONE) 20 MG tablet Take 1 tablet by mouth daily for 3 days  Qty: 3 tablet, Refills: 0              Details   omeprazole (PRILOSEC) 40 MG delayed release capsule Take 1 capsule by mouth daily      OXYGEN Inhale 2 L into the lungs       albuterol (PROVENTIL) (5 MG/ML) 0.5% nebulizer solution Take 1 mL by nebulization 4 times daily as needed for Wheezing  Qty: 120 each, Refills: 3      ipratropium (ATROVENT) 0.02 % nebulizer solution Take 2.5 mLs by nebulization 4 times daily as needed for Wheezing Dispense 120 vials, with 3 refills  Qty: 2.5 mL, Refills: 3      metoprolol succinate (TOPROL XL) 25 MG extended release tablet Take 25 mg by mouth daily      amLODIPine (NORVASC) 5 MG tablet Take 5 mg by mouth daily      atorvastatin (LIPITOR) 40 MG tablet Take 40 mg by mouth daily      aspirin 81 MG tablet Take 81 mg by mouth daily      umeclidinium-vilanterol (ANORO ELLIPTA) 62.5-25 MCG/INH AEPB inhaler Inhale 1 puff into the lungs daily      ALBUTEROL SULFATE HFA IN Inhale 2 puffs into the lungs             Time Spent on discharge is more than 30 minutes in the examination, evaluation, counseling and review of medications and discharge plan. Signed:    Hyun Masterson MD   12/18/2021      Thank you Kristin Bazan for the opportunity to be involved in this patient's care. If you have any questions or concerns please feel free to contact me at [phone].

## 2021-12-18 NOTE — CARE COORDINATION
CASE MANAGEMENT DISCHARGE SUMMARY:    DISCHARGE DATE: 12/18/21    DISCHARGED TO HOME     TRANSPORTATION: family               Electronically signed by [id number] ROLANDO aMgallon on 12/18/2021 at 9:08 AM

## 2021-12-20 NOTE — TELEPHONE ENCOUNTER
From: Haley Muniz  To: Dr. Hernandez Handsome: 12/18/2021 7:55 PM EST  Subject: [address], I was just in the hospital for COPD Exacerbation. They treated me for 2 different breathing treatments 4 times a day and it really helped me. Would it be possible to get the breathing treatments I had in the hospital as my home medications? Or would you like me to make an appointment to come see you?

## 2021-12-21 ENCOUNTER — OFFICE VISIT (OUTPATIENT)
Dept: PULMONOLOGY | Age: 81
End: 2021-12-21
Payer: MEDICARE

## 2021-12-21 VITALS
SYSTOLIC BLOOD PRESSURE: 110 MMHG | TEMPERATURE: 97.4 F | WEIGHT: 81 LBS | HEIGHT: 56 IN | HEART RATE: 88 BPM | DIASTOLIC BLOOD PRESSURE: 70 MMHG | OXYGEN SATURATION: 92 % | BODY MASS INDEX: 18.22 KG/M2 | RESPIRATION RATE: 16 BRPM

## 2021-12-21 DIAGNOSIS — J44.9 COPD, SEVERE (HCC): Primary | ICD-10-CM

## 2021-12-21 DIAGNOSIS — R64 PULMONARY CACHEXIA DUE TO CHRONIC OBSTRUCTIVE PULMONARY DISEASE (HCC): ICD-10-CM

## 2021-12-21 DIAGNOSIS — J44.9 PULMONARY CACHEXIA DUE TO CHRONIC OBSTRUCTIVE PULMONARY DISEASE (HCC): ICD-10-CM

## 2021-12-21 DIAGNOSIS — J96.11 CHRONIC RESPIRATORY FAILURE WITH HYPOXIA (HCC): ICD-10-CM

## 2021-12-21 PROCEDURE — G8419 CALC BMI OUT NRM PARAM NOF/U: HCPCS | Performed by: INTERNAL MEDICINE

## 2021-12-21 PROCEDURE — G8926 SPIRO NO PERF OR DOC: HCPCS | Performed by: INTERNAL MEDICINE

## 2021-12-21 PROCEDURE — 99214 OFFICE O/P EST MOD 30 MIN: CPT | Performed by: INTERNAL MEDICINE

## 2021-12-21 PROCEDURE — 3023F SPIROM DOC REV: CPT | Performed by: INTERNAL MEDICINE

## 2021-12-21 PROCEDURE — 1036F TOBACCO NON-USER: CPT | Performed by: INTERNAL MEDICINE

## 2021-12-21 PROCEDURE — 4040F PNEUMOC VAC/ADMIN/RCVD: CPT | Performed by: INTERNAL MEDICINE

## 2021-12-21 PROCEDURE — G8484 FLU IMMUNIZE NO ADMIN: HCPCS | Performed by: INTERNAL MEDICINE

## 2021-12-21 PROCEDURE — 1123F ACP DISCUSS/DSCN MKR DOCD: CPT | Performed by: INTERNAL MEDICINE

## 2021-12-21 PROCEDURE — G8427 DOCREV CUR MEDS BY ELIG CLIN: HCPCS | Performed by: INTERNAL MEDICINE

## 2021-12-21 PROCEDURE — 1090F PRES/ABSN URINE INCON ASSESS: CPT | Performed by: INTERNAL MEDICINE

## 2021-12-21 PROCEDURE — G8400 PT W/DXA NO RESULTS DOC: HCPCS | Performed by: INTERNAL MEDICINE

## 2021-12-21 RX ORDER — IPRATROPIUM BROMIDE AND ALBUTEROL SULFATE 2.5; .5 MG/3ML; MG/3ML
1 SOLUTION RESPIRATORY (INHALATION) EVERY 4 HOURS
Qty: 360 ML | Refills: 11 | Status: SHIPPED | OUTPATIENT
Start: 2021-12-21 | End: 2022-01-06 | Stop reason: SDUPTHER

## 2021-12-21 RX ORDER — PREDNISONE 10 MG/1
TABLET ORAL
Qty: 30 TABLET | Refills: 0 | Status: SHIPPED | OUTPATIENT
Start: 2021-12-21 | End: 2021-12-31

## 2021-12-21 RX ORDER — BUDESONIDE, GLYCOPYRROLATE, AND FORMOTEROL FUMARATE 160; 9; 4.8 UG/1; UG/1; UG/1
2 AEROSOL, METERED RESPIRATORY (INHALATION) 2 TIMES DAILY
Qty: 1 EACH | Refills: 11 | Status: SHIPPED | OUTPATIENT
Start: 2021-12-21 | End: 2022-01-06 | Stop reason: SDUPTHER

## 2021-12-21 ASSESSMENT — ENCOUNTER SYMPTOMS
SHORTNESS OF BREATH: 1
COUGH: 1

## 2021-12-21 ASSESSMENT — COPD QUESTIONNAIRES
QUESTION6_LEAVINGHOUSE: 5
QUESTION2_CHESTPHLEGM: 3
QUESTION5_HOMEACTIVITIES: 4
QUESTION7_SLEEPQUALITY: 3
QUESTION4_WALKINCLINE: 5
QUESTION8_ENERGYLEVEL: 4
QUESTION1_COUGHFREQUENCY: 5
QUESTION3_CHESTTIGHTNESS: 2
CAT_TOTALSCORE: 31

## 2021-12-21 NOTE — ASSESSMENT & PLAN NOTE
-Yonatan Jimenez uses and benefits from supplemental oxygen. She currently uses 2 L/min continuously.

## 2021-12-21 NOTE — PROGRESS NOTES
[address], SLEEP, AND CRITICAL CARE   Klaudia Rojas (:  1940) is a 80 y.o. female,Established patient, here for evaluation of the following chief complaint(s):  Breathing Problem         ASSESSMENT/PLAN:  1. COPD, severe ([facility]  Assessment & Plan:   -FEV1 43%  -Start Breztri, albuterol as needed  Orders:  -     ipratropium-albuterol (DUONEB) 0.5-2.5 (3) MG/3ML SOLN nebulizer solution; Inhale 3 mLs into the lungs every 4 hours, Disp-360 mL, R-11Print  -     Budeson-Glycopyrrol-Formoterol (BREZTRI AEROSPHERE) 160-9-4.8 MCG/ACT AERO; Inhale 2 puffs into the lungs 2 times daily, Disp-1 each, R-11Normal  -     predniSONE (DELTASONE) 10 MG tablet; Take 40 mg by mouth for 3 days 30 mg for 3 days 20 mg for 3 days 10 mg for 3 days. , Disp-30 tablet, R-0Normal  2. 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 Chronic respiratory failure with hypoxia [facility])  Assessment & Plan:  -Klaudia Rojas uses and benefits from supplemental oxygen. She currently uses 2 L/min continuously. No follow-ups on file. Future Appointments   Date Time Provider Ines Ramirez   2022  1:20 PM [facility]         Subjective   SUBJECTIVE/OBJECTIVE:  Patient was in the ER recently for COPD exacerbation. He was discharged with prednisone. Feels somewhat better but wheezing and dyspnea remain as well as chronic cough. She was on albuterol/ipratropium nebs in the hospital asking if she can get these as an outpatient. She also has no longer on controller inhaler Anoro as it dried out her mouth too much. Review of Systems   Constitutional: Negative. Respiratory: Positive for cough and shortness of breath. Cardiovascular: Negative. Genitourinary: Negative. Musculoskeletal: Negative. Neurological: Negative. Psychiatric/Behavioral: Negative. Objective   Physical Exam    Gen:  No acute distress. Thin  Eyes: PERRL. EOMI.  Anicteric sclera. No conjunctival injection. ENT: No discharge. oropharynx clear. External appearance of ears and nose normal.  Neck: Trachea midline. No mass   Resp:  No crackles. No wheezes. No rhonchi. No dullness on percussion. CV: Regular rate. Regular rhythm. No murmur or rub. No edema. GI: Soft, Non-tender. Non-distended. +BS  Skin: Warm, dry, w/o erythema. Lymph: No cervical or supraclavicular LAD. M/S: No cyanosis. No clubbing. Neuro:  no focal neurologic deficit. Moves all extremities  Psych: Awake and alert, Oriented x 3. Judgement and insight appropriate. Mood stable. On this date 12/21/2021 I have spent 31 minutes reviewing previous notes, test results and face to face with the patient discussing the diagnosis and importance of compliance with the treatment plan as well as documenting on the day of the visit. An electronic signature was used to authenticate this note.     --Usha iKng MD

## 2022-01-06 DIAGNOSIS — J44.9 COPD, SEVERE (HCC): ICD-10-CM

## 2022-01-06 NOTE — TELEPHONE ENCOUNTER
Last office visit 12/21;  next appt 2/2022    Wants to get from mail order pharmacy which is cheaper

## 2022-01-07 RX ORDER — IPRATROPIUM BROMIDE AND ALBUTEROL SULFATE 2.5; .5 MG/3ML; MG/3ML
1 SOLUTION RESPIRATORY (INHALATION) EVERY 4 HOURS
Qty: 1080 ML | Refills: 3 | Status: SHIPPED | OUTPATIENT
Start: 2022-01-07 | End: 2022-06-30

## 2022-01-07 RX ORDER — BUDESONIDE, GLYCOPYRROLATE, AND FORMOTEROL FUMARATE 160; 9; 4.8 UG/1; UG/1; UG/1
2 AEROSOL, METERED RESPIRATORY (INHALATION) 2 TIMES DAILY
Qty: 3 EACH | Refills: 3 | Status: SHIPPED | OUTPATIENT
Start: 2022-01-07

## 2022-02-18 ENCOUNTER — OFFICE VISIT (OUTPATIENT)
Dept: PULMONOLOGY | Age: 82
End: 2022-02-18
Payer: MEDICARE

## 2022-02-18 VITALS
RESPIRATION RATE: 20 BRPM | HEIGHT: 58 IN | HEART RATE: 67 BPM | BODY MASS INDEX: 17.84 KG/M2 | DIASTOLIC BLOOD PRESSURE: 80 MMHG | OXYGEN SATURATION: 96 % | TEMPERATURE: 96.9 F | WEIGHT: 85 LBS | SYSTOLIC BLOOD PRESSURE: 130 MMHG

## 2022-02-18 DIAGNOSIS — J96.11 CHRONIC RESPIRATORY FAILURE WITH HYPOXIA (HCC): ICD-10-CM

## 2022-02-18 DIAGNOSIS — J44.9 PULMONARY CACHEXIA DUE TO CHRONIC OBSTRUCTIVE PULMONARY DISEASE (HCC): ICD-10-CM

## 2022-02-18 DIAGNOSIS — J44.9 COPD, SEVERE (HCC): Primary | ICD-10-CM

## 2022-02-18 DIAGNOSIS — R64 PULMONARY CACHEXIA DUE TO CHRONIC OBSTRUCTIVE PULMONARY DISEASE (HCC): ICD-10-CM

## 2022-02-18 PROCEDURE — G8427 DOCREV CUR MEDS BY ELIG CLIN: HCPCS | Performed by: INTERNAL MEDICINE

## 2022-02-18 PROCEDURE — 4040F PNEUMOC VAC/ADMIN/RCVD: CPT | Performed by: INTERNAL MEDICINE

## 2022-02-18 PROCEDURE — 99214 OFFICE O/P EST MOD 30 MIN: CPT | Performed by: INTERNAL MEDICINE

## 2022-02-18 PROCEDURE — 1123F ACP DISCUSS/DSCN MKR DOCD: CPT | Performed by: INTERNAL MEDICINE

## 2022-02-18 PROCEDURE — 3023F SPIROM DOC REV: CPT | Performed by: INTERNAL MEDICINE

## 2022-02-18 PROCEDURE — G8419 CALC BMI OUT NRM PARAM NOF/U: HCPCS | Performed by: INTERNAL MEDICINE

## 2022-02-18 PROCEDURE — 1090F PRES/ABSN URINE INCON ASSESS: CPT | Performed by: INTERNAL MEDICINE

## 2022-02-18 PROCEDURE — G8400 PT W/DXA NO RESULTS DOC: HCPCS | Performed by: INTERNAL MEDICINE

## 2022-02-18 PROCEDURE — 1036F TOBACCO NON-USER: CPT | Performed by: INTERNAL MEDICINE

## 2022-02-18 PROCEDURE — G8484 FLU IMMUNIZE NO ADMIN: HCPCS | Performed by: INTERNAL MEDICINE

## 2022-02-18 NOTE — PROGRESS NOTES
[address], SLEEP, AND CRITICAL CARE   Deisy Downs (:  1940) is a 80 y.o. female,Established patient, here for evaluation of the following chief complaint(s):  Follow-up and COPD          ASSESSMENT/PLAN:  1. COPD, severe ([address]  Assessment & Plan:   -FEV1 43%  -Tolerating Breztri, albuterol as needed  -Infrequent exacerbations will start Daliresp. Initiate 250 luh titration 28 days then up to 500 if tolerating  -Oxygen dependent  -Would likely benefit from pulmonary rehab. She has completed in the past but now is more sedentary. She says transportation is the biggest barrier for her she cannot make pulmonary rehab sessions. Orders:  -     Roflumilast (DALIRESP) 250 MCG tablet; Take 1 tablet by mouth daily for 28 days, THEN 2 tablets daily for 28 days. , Disp-84 tablet, R-1Normal  2. Chronic respiratory failure with hypoxia (HCC)  Assessment & Plan:   During Esperanza Mooney continues to use and benefit from supplemental oxygen  -Portable oxygen concentrator is making several strange noises during my visit and in fact shuts off spontaneously at one point. We will call [facility] to come evaluate. She has a home concentrator and tanks for backup  3. 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 Future Appointments   Date Time Provider Iens Ashleyi   2022  1:40 PM Rebeka Barragan [facility]         Subjective   SUBJECTIVE/OBJECTIVE:  Since last visit no hospitalizations but has had exacerbation. Increasing shortness of breath. No wheezing. Associated cough. -Notices her oxygen drops to 80% when exerting herself. We instructed her on titrating oxygen based on pulse oximetry of 88% or higher      Review of Systems   Constitutional: Negative. Cardiovascular: Negative. Genitourinary: Negative. Musculoskeletal: Negative. Neurological: Negative. Psychiatric/Behavioral: Negative.            Objective Physical Exam    Gen:  No acute distress. Thin  Eyes: PERRL. EOMI. Anicteric sclera. No conjunctival injection. ENT: No discharge. oropharynx clear. External appearance of ears and nose normal.  Neck: Trachea midline. No mass   Resp:  No crackles. No wheezes. No rhonchi. No dullness on percussion. CV: Regular rate. Regular rhythm. No murmur or rub. No edema. GI: Soft, Non-tender. Non-distended. +BS  Skin: Warm, dry, w/o erythema. Lymph: No cervical or supraclavicular LAD. M/S: No cyanosis. No clubbing. Neuro:  no focal neurologic deficit. Moves all extremities  Psych: Awake and alert, Oriented x 3. Judgement and insight appropriate. Mood stable. An electronic signature was used to authenticate this note.     --Saskia Santana MD

## 2022-02-18 NOTE — ASSESSMENT & PLAN NOTE
-FEV1 43%  -Tolerating Breztri, albuterol as needed  -Infrequent exacerbations will start Daliresp. Initiate 250 luh titration 28 days then up to 500 if tolerating  -Oxygen dependent  -Would likely benefit from pulmonary rehab. She has completed in the past but now is more sedentary. She says transportation is the biggest barrier for her she cannot make pulmonary rehab sessions.

## 2022-02-18 NOTE — ASSESSMENT & PLAN NOTE
During Madison Better continues to use and benefit from supplemental oxygen  -Portable oxygen concentrator is making several strange noises during my visit and in fact shuts off spontaneously at one point. We will call [facility] to come evaluate.   She has a home concentrator and tanks for backup

## 2022-03-30 ENCOUNTER — TELEPHONE (OUTPATIENT)
Dept: PULMONOLOGY | Age: 82
End: 2022-03-30

## 2022-03-30 DIAGNOSIS — J44.9 COPD, SEVERE (HCC): Primary | ICD-10-CM

## 2022-03-30 RX ORDER — PREDNISONE 10 MG/1
TABLET ORAL
Qty: 30 TABLET | Refills: 0 | Status: SHIPPED | OUTPATIENT
Start: 2022-03-30 | End: 2022-04-09

## 2022-03-30 NOTE — TELEPHONE ENCOUNTER
Complains of cough and SOB  Duration 3 days   Cough with sputum production? Yes   Color clear or white   Fever? None   Any other Symptoms? Wheezing tightness in chest   Any current treatment tried? none  Using inhalers? Yes  do they help? Somewhat   Pharmacy? [id number]      Patient had an incident with her Oxygen 3 days ago to where she wasn't getting the proper amount since then her breathing has worsened. She is starting to have more exacerbations and is feeling they are very uncontrolled. She is scheduled to come in 4/6.  Can we call something in for her to calm things down or wait until appointment

## 2022-03-30 NOTE — TELEPHONE ENCOUNTER
Complains of cough and SOB  Duration 3 days   Cough with sputum production? Yes   Color clear or white   Fever? None   Any other Symptoms? Wheezing tightness in chest   Any current treatment tried? none  Using inhalers? Yes  do they help? Somewhat   Pharmacy? [id number] [facility]               Patient had an incident with her Oxygen 3 days ago to where she wasn't getting the proper amount since then her breathing has worsened. She is starting to have more exacerbations and is feeling they are very uncontrolled. She is scheduled to come in 4/6.  Can we call something in for her to calm things down or wait until appointment

## 2022-04-06 ENCOUNTER — OFFICE VISIT (OUTPATIENT)
Dept: PULMONOLOGY | Age: 82
End: 2022-04-06
Payer: MEDICARE

## 2022-04-06 VITALS
TEMPERATURE: 96.5 F | SYSTOLIC BLOOD PRESSURE: 120 MMHG | DIASTOLIC BLOOD PRESSURE: 80 MMHG | BODY MASS INDEX: 17.77 KG/M2 | RESPIRATION RATE: 16 BRPM | HEART RATE: 86 BPM | OXYGEN SATURATION: 97 % | HEIGHT: 58 IN

## 2022-04-06 DIAGNOSIS — J44.9 COPD, SEVERE (HCC): Primary | ICD-10-CM

## 2022-04-06 DIAGNOSIS — J96.11 CHRONIC RESPIRATORY FAILURE WITH HYPOXIA (HCC): ICD-10-CM

## 2022-04-06 PROCEDURE — 1123F ACP DISCUSS/DSCN MKR DOCD: CPT | Performed by: INTERNAL MEDICINE

## 2022-04-06 PROCEDURE — 4040F PNEUMOC VAC/ADMIN/RCVD: CPT | Performed by: INTERNAL MEDICINE

## 2022-04-06 PROCEDURE — 3023F SPIROM DOC REV: CPT | Performed by: INTERNAL MEDICINE

## 2022-04-06 PROCEDURE — 99214 OFFICE O/P EST MOD 30 MIN: CPT | Performed by: INTERNAL MEDICINE

## 2022-04-06 PROCEDURE — 1090F PRES/ABSN URINE INCON ASSESS: CPT | Performed by: INTERNAL MEDICINE

## 2022-04-06 PROCEDURE — G8427 DOCREV CUR MEDS BY ELIG CLIN: HCPCS | Performed by: INTERNAL MEDICINE

## 2022-04-06 PROCEDURE — 1036F TOBACCO NON-USER: CPT | Performed by: INTERNAL MEDICINE

## 2022-04-06 PROCEDURE — G8419 CALC BMI OUT NRM PARAM NOF/U: HCPCS | Performed by: INTERNAL MEDICINE

## 2022-04-06 PROCEDURE — G8400 PT W/DXA NO RESULTS DOC: HCPCS | Performed by: INTERNAL MEDICINE

## 2022-04-06 RX ORDER — ALBUTEROL SULFATE 90 UG/1
2 AEROSOL, METERED RESPIRATORY (INHALATION) EVERY 4 HOURS PRN
Qty: 18 G | Refills: 3 | Status: SHIPPED | OUTPATIENT
Start: 2022-04-06

## 2022-04-06 RX ORDER — PREDNISONE 20 MG/1
10 TABLET ORAL DAILY
Qty: 30 TABLET | Refills: 3 | Status: SHIPPED | OUTPATIENT
Start: 2022-04-06 | End: 2022-05-06

## 2022-04-06 ASSESSMENT — COPD QUESTIONNAIRES: COPD: 1

## 2022-06-29 DIAGNOSIS — J44.9 COPD, SEVERE (HCC): ICD-10-CM

## 2022-06-30 RX ORDER — IPRATROPIUM BROMIDE AND ALBUTEROL SULFATE 2.5; .5 MG/3ML; MG/3ML
SOLUTION RESPIRATORY (INHALATION)
Qty: 1620 ML | Refills: 5 | Status: SHIPPED | OUTPATIENT
Start: 2022-06-30

## 2022-07-15 ENCOUNTER — OFFICE VISIT (OUTPATIENT)
Dept: PULMONOLOGY | Age: 82
End: 2022-07-15
Payer: MEDICARE

## 2022-07-15 VITALS
HEART RATE: 58 BPM | BODY MASS INDEX: 17.77 KG/M2 | OXYGEN SATURATION: 96 % | DIASTOLIC BLOOD PRESSURE: 70 MMHG | RESPIRATION RATE: 16 BRPM | HEIGHT: 58 IN | SYSTOLIC BLOOD PRESSURE: 120 MMHG | TEMPERATURE: 97.4 F

## 2022-07-15 DIAGNOSIS — J96.11 CHRONIC RESPIRATORY FAILURE WITH HYPOXIA (HCC): ICD-10-CM

## 2022-07-15 DIAGNOSIS — J44.9 COPD, SEVERE (HCC): Primary | ICD-10-CM

## 2022-07-15 PROCEDURE — 99214 OFFICE O/P EST MOD 30 MIN: CPT | Performed by: INTERNAL MEDICINE

## 2022-07-15 PROCEDURE — 1123F ACP DISCUSS/DSCN MKR DOCD: CPT | Performed by: INTERNAL MEDICINE

## 2022-07-15 RX ORDER — PREDNISONE 20 MG/1
10 TABLET ORAL DAILY
Qty: 30 TABLET | Refills: 11 | Status: SHIPPED | OUTPATIENT
Start: 2022-07-15 | End: 2022-08-14

## 2022-07-15 ASSESSMENT — COPD QUESTIONNAIRES: COPD: 1

## 2022-07-15 NOTE — PROGRESS NOTES
[address], SLEEP, AND CRITICAL CARE   Pascual Rivera (:  1940) is a 80 y.o. female,Established patient, here for evaluation of the following chief complaint(s):  COPD          ASSESSMENT/PLAN:  1. COPD, severe ([address]  Assessment & Plan:   -FEV1 43%  - Breztri, albuterol as needed  -Daliresp 500  -Oxygen dependent, on prednisone chronically increase symptoms on 5 mg we will increase back to 10 mg  -Also follows with palliative care  Orders:  -     predniSONE (DELTASONE) 20 MG tablet; Take 0.5 tablets by mouth in the morning., Disp-30 tablet, R-11Normal  2. Chronic respiratory failure with hypoxia [facility])  Assessment & Plan:  Pascual Rivera  continues to use and benefit from supplemental oxygen. She is on 3 L/min continuous. We will send an order for a new POC machine her battery is not holding a charge. Return in about 3 months (around 10/15/2022). No future appointments. Subjective   SUBJECTIVE/OBJECTIVE:  Dyspnea stable though does appear to have less exacerbations.  -Follows with palliative care now  -On Breztri and albuterol  -Daliresp chronic prednisone, increase symptoms since reducing dose from 10-5. Oxygen recertification today    COPD      Review of Systems   Constitutional: Negative. Cardiovascular: Negative. Genitourinary: Negative. Musculoskeletal: Negative. Neurological: Negative. Psychiatric/Behavioral: Negative. Objective   Physical Exam    Gen:  No acute distress. Thin  Eyes: PERRL. EOMI. Anicteric sclera. No conjunctival injection. ENT: No discharge. oropharynx clear. External appearance of ears and nose normal.  Neck: Trachea midline. No mass   Resp:  No crackles. No wheezes. No rhonchi. No dullness on percussion. CV: Regular rate. Regular rhythm. No murmur or rub. No edema. GI: Soft, Non-tender. Non-distended. +BS  Skin: Warm, dry, w/o erythema. Lymph: No cervical or supraclavicular LAD. M/S: No cyanosis. No clubbing. Neuro:  no focal neurologic deficit. Moves all extremities  Psych: Awake and alert, Oriented x 3. Judgement and insight appropriate. Mood stable. An electronic signature was used to authenticate this note.     --Davon Lubin MD

## 2022-07-15 NOTE — ASSESSMENT & PLAN NOTE
Patricia Sharma  continues to use and benefit from supplemental oxygen. She is on 3 L/min continuous. We will send an order for a new POC machine her battery is not holding a charge.

## 2022-07-15 NOTE — ASSESSMENT & PLAN NOTE
-FEV1 43%  - Breztri, albuterol as needed  -Daliresp 500  -Oxygen dependent, on prednisone chronically increase symptoms on 5 mg we will increase back to 10 mg  -Also follows with palliative care

## 2022-12-19 NOTE — PROGRESS NOTES
[facility] Cardiopulmonary Rehabilitation    Qualifying Data for Home Oxygen        Resting Oxygen Saturation on Room Air:   96%    Exercise Oxygen Saturation on Room Air:  80%      Resting Oxygen Saturation on Oxygen:  99% 2 LPM      Exercise Oxygen Saturation on Oxygen: 92% 2 LPM 4 = No assist / stand by assistance